# Patient Record
Sex: MALE | Race: WHITE | NOT HISPANIC OR LATINO | Employment: OTHER | URBAN - METROPOLITAN AREA
[De-identification: names, ages, dates, MRNs, and addresses within clinical notes are randomized per-mention and may not be internally consistent; named-entity substitution may affect disease eponyms.]

---

## 2017-03-07 ENCOUNTER — NON-PROVIDER VISIT (OUTPATIENT)
Dept: CARDIOLOGY | Facility: MEDICAL CENTER | Age: 67
End: 2017-03-07
Attending: INTERNAL MEDICINE
Payer: COMMERCIAL

## 2017-03-07 VITALS
BODY MASS INDEX: 24.9 KG/M2 | HEIGHT: 74 IN | HEART RATE: 73 BPM | DIASTOLIC BLOOD PRESSURE: 82 MMHG | SYSTOLIC BLOOD PRESSURE: 140 MMHG | OXYGEN SATURATION: 98 % | WEIGHT: 194 LBS

## 2017-03-07 DIAGNOSIS — I25.10 CORONARY ARTERY DISEASE INVOLVING NATIVE CORONARY ARTERY OF NATIVE HEART WITHOUT ANGINA PECTORIS: ICD-10-CM

## 2017-03-07 DIAGNOSIS — Z95.1 S/P CABG (CORONARY ARTERY BYPASS GRAFT): ICD-10-CM

## 2017-03-07 LAB — TREADMILL STRESS: NORMAL

## 2017-03-07 PROCEDURE — 93015 CV STRESS TEST SUPVJ I&R: CPT | Performed by: INTERNAL MEDICINE

## 2017-03-08 ENCOUNTER — TELEPHONE (OUTPATIENT)
Dept: CARDIOLOGY | Facility: MEDICAL CENTER | Age: 67
End: 2017-03-08

## 2017-03-08 NOTE — TELEPHONE ENCOUNTER
Called patient and advised him of Dr. Nina's treadmill stress test interpretation. Patient verbalized understanding and is thankful for the call.    KENNEDY PAK

## 2017-03-08 NOTE — TELEPHONE ENCOUNTER
----- Message from Emy Nina M.D. sent at 3/8/2017 11:20 AM PST -----  No evidence of new blockeages or concerns  Hope this finds him well    Tx JS

## 2017-03-29 ENCOUNTER — HOSPITAL ENCOUNTER (OUTPATIENT)
Dept: CARDIOLOGY | Facility: MEDICAL CENTER | Age: 67
End: 2017-03-29
Attending: INTERNAL MEDICINE
Payer: COMMERCIAL

## 2017-03-29 DIAGNOSIS — I25.10 CORONARY ARTERY DISEASE INVOLVING NATIVE CORONARY ARTERY OF NATIVE HEART WITHOUT ANGINA PECTORIS: ICD-10-CM

## 2017-03-29 PROCEDURE — 93306 TTE W/DOPPLER COMPLETE: CPT | Mod: 26 | Performed by: INTERNAL MEDICINE

## 2017-03-29 PROCEDURE — 93306 TTE W/DOPPLER COMPLETE: CPT

## 2017-03-30 LAB
LV EJECT FRACT  99904: 60
LV EJECT FRACT MOD 2C 99903: 52.28
LV EJECT FRACT MOD 4C 99902: 62.4
LV EJECT FRACT MOD BP 99901: 57.51

## 2017-03-31 ENCOUNTER — TELEPHONE (OUTPATIENT)
Dept: CARDIOLOGY | Facility: MEDICAL CENTER | Age: 67
End: 2017-03-31

## 2017-03-31 NOTE — TELEPHONE ENCOUNTER
----- Message from Sonya Ramírez R.N. sent at 3/31/2017  4:12 PM PDT -----      ----- Message -----     From: Eym Nina M.D.     Sent: 3/31/2017   2:25 PM       To: Khushi Conway R.N.    Normal & reassuring echo   Great news

## 2017-10-03 ENCOUNTER — TELEPHONE (OUTPATIENT)
Dept: CARDIOLOGY | Facility: MEDICAL CENTER | Age: 67
End: 2017-10-03

## 2017-10-03 DIAGNOSIS — I25.10 CORONARY ARTERY DISEASE INVOLVING NATIVE CORONARY ARTERY OF NATIVE HEART WITHOUT ANGINA PECTORIS: ICD-10-CM

## 2017-10-03 DIAGNOSIS — E78.5 DYSLIPIDEMIA: ICD-10-CM

## 2017-10-03 NOTE — TELEPHONE ENCOUNTER
----- Message from Bonny Norris sent at 10/3/2017  9:31 AM PDT -----  Regarding: lab slip  Contact: [phone]  LA/lissett Russell calling for lab slip in preparation for 11/1 appt with LA, please call if questions, [phone]  Please mail to:  [address]   [facility] [id number]

## 2017-10-24 ENCOUNTER — HOSPITAL ENCOUNTER (OUTPATIENT)
Dept: LAB | Facility: MEDICAL CENTER | Age: 67
End: 2017-10-24
Attending: INTERNAL MEDICINE
Payer: COMMERCIAL

## 2017-10-24 DIAGNOSIS — E78.5 DYSLIPIDEMIA: ICD-10-CM

## 2017-10-24 DIAGNOSIS — I25.10 CORONARY ARTERY DISEASE INVOLVING NATIVE CORONARY ARTERY OF NATIVE HEART WITHOUT ANGINA PECTORIS: ICD-10-CM

## 2017-10-24 PROCEDURE — 80053 COMPREHEN METABOLIC PANEL: CPT

## 2017-10-24 PROCEDURE — 36415 COLL VENOUS BLD VENIPUNCTURE: CPT

## 2017-10-24 PROCEDURE — 80061 LIPID PANEL: CPT

## 2017-10-25 LAB
ALBUMIN SERPL BCP-MCNC: 4 G/DL (ref 3.2–4.9)
ALBUMIN/GLOB SERPL: 1.5 G/DL
ALP SERPL-CCNC: 61 U/L (ref 30–99)
ALT SERPL-CCNC: 19 U/L (ref 2–50)
ANION GAP SERPL CALC-SCNC: 5 MMOL/L (ref 0–11.9)
AST SERPL-CCNC: 25 U/L (ref 12–45)
BILIRUB SERPL-MCNC: 0.9 MG/DL (ref 0.1–1.5)
BUN SERPL-MCNC: 20 MG/DL (ref 8–22)
CALCIUM SERPL-MCNC: 9.4 MG/DL (ref 8.5–10.5)
CHLORIDE SERPL-SCNC: 109 MMOL/L (ref 96–112)
CHOLEST SERPL-MCNC: 140 MG/DL (ref 100–199)
CO2 SERPL-SCNC: 27 MMOL/L (ref 20–33)
CREAT SERPL-MCNC: 1.13 MG/DL (ref 0.5–1.4)
GFR SERPL CREATININE-BSD FRML MDRD: >60 ML/MIN/1.73 M 2
GLOBULIN SER CALC-MCNC: 2.6 G/DL (ref 1.9–3.5)
GLUCOSE SERPL-MCNC: 93 MG/DL (ref 65–99)
HDLC SERPL-MCNC: 55 MG/DL
LDLC SERPL CALC-MCNC: 75 MG/DL
POTASSIUM SERPL-SCNC: 4.7 MMOL/L (ref 3.6–5.5)
PROT SERPL-MCNC: 6.6 G/DL (ref 6–8.2)
SODIUM SERPL-SCNC: 141 MMOL/L (ref 135–145)
TRIGL SERPL-MCNC: 50 MG/DL (ref 0–149)

## 2017-11-01 ENCOUNTER — OFFICE VISIT (OUTPATIENT)
Dept: CARDIOLOGY | Facility: MEDICAL CENTER | Age: 67
End: 2017-11-01
Payer: COMMERCIAL

## 2017-11-01 VITALS
HEIGHT: 74 IN | BODY MASS INDEX: 25.77 KG/M2 | DIASTOLIC BLOOD PRESSURE: 80 MMHG | SYSTOLIC BLOOD PRESSURE: 142 MMHG | WEIGHT: 200.8 LBS | HEART RATE: 52 BPM | OXYGEN SATURATION: 97 %

## 2017-11-01 DIAGNOSIS — Z95.1 S/P CABG (CORONARY ARTERY BYPASS GRAFT): ICD-10-CM

## 2017-11-01 DIAGNOSIS — E78.00 PURE HYPERCHOLESTEROLEMIA: ICD-10-CM

## 2017-11-01 DIAGNOSIS — I25.10 CORONARY ARTERY DISEASE INVOLVING NATIVE CORONARY ARTERY OF NATIVE HEART WITHOUT ANGINA PECTORIS: ICD-10-CM

## 2017-11-01 DIAGNOSIS — I24.9 ACS (ACUTE CORONARY SYNDROME) (HCC): ICD-10-CM

## 2017-11-01 PROCEDURE — 99214 OFFICE O/P EST MOD 30 MIN: CPT | Performed by: INTERNAL MEDICINE

## 2017-11-01 ASSESSMENT — ENCOUNTER SYMPTOMS
NAUSEA: 0
EYES NEGATIVE: 1
CLAUDICATION: 0
WHEEZING: 0
LOSS OF CONSCIOUSNESS: 0
WEIGHT LOSS: 0
BRUISES/BLEEDS EASILY: 0
HEARTBURN: 0
PALPITATIONS: 0
COUGH: 0
DEPRESSION: 0
MYALGIAS: 0
NERVOUS/ANXIOUS: 0
DIZZINESS: 0

## 2017-11-01 NOTE — PROGRESS NOTES
Subjective:   José Arauz is a 66 y.o. male who presents today in follow-up in regards to his multivessel CABG in 2012 in the setting of acute coronary syndrome    Doing very well, fishing in [place] and [place]. Wife is here with him doing well too  One of the best years he's had  No setbacks or chest pains  No trouble with medication        Past Medical History:   Diagnosis Date   • ACS (acute coronary syndrome) (CMS-HCC) 10/24/2012    -10/2012 (Trop 0.3)    • CAD (coronary artery disease) 1/4/2013    -s/p CABG 10/2012 (EF 60%); left internal mammary to left anterior descending, right saphenous vein graft to the first diagonal, saphenous vein graft to the most distal circumflex obtuse marginal branch, and saphenous vein graft to the right posterior descending  (small NSTEMI)     • Other and unspecified hyperlipidemia 1/4/2013     Past Surgical History:   Procedure Laterality Date   • MULTIPLE CORONARY ARTERY BYPASS ENDO VEIN HARVEST  10/25/2012    Performed by Abdi Worthington M.D. at SURGERY [facility] ORS   • OTHER  1975    tonsillectomy   • OTHER  33 yrs ago    vasectomy     Family History   Problem Relation Age of Onset   • Heart Disease Neg Hx    • Heart Failure Neg Hx      History   Smoking Status   • Never Smoker   Smokeless Tobacco   • Never Used     No Known Allergies  Outpatient Encounter Prescriptions as of 11/1/2017   Medication Sig Dispense Refill   • atorvastatin (LIPITOR) 40 MG Tab TAKE 1 TABLET AT BEDTIME 90 Tab 3   • metoprolol (LOPRESSOR) 25 MG Tab TAKE 1 TABLET TWICE A  Tab 3   • Misc Natural Products (GLUCOSAMINE CHOND COMPLEX/MSM PO) Take  by mouth.     • Ascorbic Acid (VITAMIN C PO) Take  by mouth every day.     • aspirin EC 81 MG EC tablet Take 1 Tab by mouth every day. 30 Tab    • atorvastatin (LIPITOR) 40 MG TABS Take 20 mg by mouth every evening.       No facility-administered encounter medications on file as of 11/1/2017.      Review of Systems   Constitutional: Negative for  "malaise/fatigue and weight loss.   Eyes: Negative.    Respiratory: Negative for cough and wheezing.    Cardiovascular: Negative for chest pain, palpitations, claudication and leg swelling.   Gastrointestinal: Negative for heartburn and nausea.   Musculoskeletal: Negative for joint pain and myalgias.   Neurological: Negative for dizziness and loss of consciousness.   Endo/Heme/Allergies: Does not bruise/bleed easily.   Psychiatric/Behavioral: Negative for depression. The patient is not nervous/anxious.    All other systems reviewed and are negative.       Objective:   /80   Pulse (!) 52   Ht 1.88 m (6' 2\")   Wt 91.1 kg (200 lb 12.8 oz)   SpO2 97%   BMI 25.78 kg/m²     Physical Exam   Constitutional: He is oriented to person, place, and time. He appears well-developed.   HENT:   Head: Normocephalic and atraumatic.   Mouth/Throat: Mucous membranes are normal.   Eyes: Conjunctivae and EOM are normal.   Neck: No JVD present. No thyroid mass and no thyromegaly present.   Cardiovascular: Normal rate, regular rhythm, normal heart sounds and intact distal pulses.    No murmur heard.  Well-healed sternal scar   Pulmonary/Chest: Effort normal and breath sounds normal. No respiratory distress.   Abdominal: Bowel sounds are normal.   Musculoskeletal: Normal range of motion. He exhibits no edema or tenderness.   Neurological: He is alert and oriented to person, place, and time. He has normal strength. He displays no tremor. No cranial nerve deficit. He exhibits normal muscle tone.   Skin: Skin is warm and dry. No rash noted.   Psychiatric: He has a normal mood and affect. His behavior is normal.   Vitals reviewed.      Assessment:     1. Coronary artery disease involving native coronary artery of native heart without angina pectoris     2. ACS (acute coronary syndrome) (CMS-HCC)     3. S/P CABG (coronary artery bypass graft)     4. Pure hypercholesterolemia         Medical Decision Making:  Today's Assessment / Status " / Plan:     Healthy and highly active gentleman. We have done Minimal noninvasive chen tingSubsequent to his surgery, and I think it is indicated.     Coronary disease/CABG  Normal exercise tolerance test and echo earlier this year. Reviewed again  Prognosis excellent  Statin and aspirin and low-dose beta blocker  Blood pressure excellent      We talked about the risks and benefits his current medical regimen, he is quite stable on it and no changes are made  We spent more than 5 minutes reviewing lab work in talking about low LDLs in the setting of secondary events, meds are renewed. As usual talked about emergency medical services    RTC with blood work in one year

## 2018-08-28 ENCOUNTER — APPOINTMENT (RX ONLY)
Dept: URBAN - NONMETROPOLITAN AREA CLINIC 1 | Facility: CLINIC | Age: 68
Setting detail: DERMATOLOGY
End: 2018-08-28

## 2018-08-28 DIAGNOSIS — L72.0 EPIDERMAL CYST: ICD-10-CM

## 2018-08-28 DIAGNOSIS — L81.4 OTHER MELANIN HYPERPIGMENTATION: ICD-10-CM

## 2018-08-28 DIAGNOSIS — L82.0 INFLAMED SEBORRHEIC KERATOSIS: ICD-10-CM

## 2018-08-28 DIAGNOSIS — L57.0 ACTINIC KERATOSIS: ICD-10-CM

## 2018-08-28 DIAGNOSIS — D485 NEOPLASM OF UNCERTAIN BEHAVIOR OF SKIN: ICD-10-CM

## 2018-08-28 PROBLEM — D48.5 NEOPLASM OF UNCERTAIN BEHAVIOR OF SKIN: Status: ACTIVE | Noted: 2018-08-28

## 2018-08-28 PROCEDURE — ? OBSERVATION

## 2018-08-28 PROCEDURE — ? COUNSELING

## 2018-08-28 PROCEDURE — 17000 DESTRUCT PREMALG LESION: CPT

## 2018-08-28 PROCEDURE — 99202 OFFICE O/P NEW SF 15 MIN: CPT | Mod: 25

## 2018-08-28 PROCEDURE — ? LIQUID NITROGEN

## 2018-08-28 ASSESSMENT — LOCATION DETAILED DESCRIPTION DERM
LOCATION DETAILED: NASAL DORSUM
LOCATION DETAILED: LEFT PROXIMAL DORSAL FOREARM
LOCATION DETAILED: MID POSTERIOR NECK
LOCATION DETAILED: RIGHT SUPERIOR UPPER BACK
LOCATION DETAILED: RIGHT PROXIMAL DORSAL FOREARM
LOCATION DETAILED: RIGHT INFERIOR MEDIAL UPPER BACK

## 2018-08-28 ASSESSMENT — LOCATION ZONE DERM
LOCATION ZONE: ARM
LOCATION ZONE: NOSE
LOCATION ZONE: NECK
LOCATION ZONE: TRUNK

## 2018-08-28 ASSESSMENT — LOCATION SIMPLE DESCRIPTION DERM
LOCATION SIMPLE: POSTERIOR NECK
LOCATION SIMPLE: NOSE
LOCATION SIMPLE: RIGHT FOREARM
LOCATION SIMPLE: RIGHT UPPER BACK
LOCATION SIMPLE: LEFT FOREARM

## 2018-08-28 NOTE — PROCEDURE: LIQUID NITROGEN
Render Post-Care Instructions In Note?: no
Consent: The patient's consent was obtained including but not limited to risks of crusting, scabbing, blistering, scarring, darker or lighter pigmentary change, recurrence, incomplete removal and infection.
Duration Of Freeze Thaw-Cycle (Seconds): 8
Number Of Freeze-Thaw Cycles: 1 freeze-thaw cycle
Detail Level: Detailed
Post-Care Instructions: I reviewed with the patient in detail post-care instructions. Patient is to wear sunprotection, and avoid picking at any of the treated lesions. Pt may apply Vaseline to crusted or scabbing areas.

## 2018-11-05 ENCOUNTER — TELEPHONE (OUTPATIENT)
Dept: CARDIOLOGY | Facility: MEDICAL CENTER | Age: 68
End: 2018-11-05

## 2018-11-05 DIAGNOSIS — E78.00 PURE HYPERCHOLESTEROLEMIA: ICD-10-CM

## 2018-11-05 DIAGNOSIS — Z95.1 S/P CABG (CORONARY ARTERY BYPASS GRAFT): ICD-10-CM

## 2018-11-05 DIAGNOSIS — I24.9 ACS (ACUTE CORONARY SYNDROME) (HCC): ICD-10-CM

## 2018-11-05 DIAGNOSIS — I25.10 CORONARY ARTERY DISEASE INVOLVING NATIVE CORONARY ARTERY OF NATIVE HEART WITHOUT ANGINA PECTORIS: ICD-10-CM

## 2018-11-05 NOTE — TELEPHONE ENCOUNTER
Bonny Chaudhry, R.IVETTE.   Phone Number: [phone]             LA/yan     Pt calling for lab slip in preparation for his appt with LA. If questions, please call pt at [phone]     Please mail to:   José Arauz   [address]    [place] [address]     CMP and Lipid profile ordered, lab slip mailed to pt

## 2018-11-20 ENCOUNTER — HOSPITAL ENCOUNTER (OUTPATIENT)
Dept: LAB | Facility: MEDICAL CENTER | Age: 68
End: 2018-11-20
Attending: INTERNAL MEDICINE
Payer: COMMERCIAL

## 2018-11-20 DIAGNOSIS — Z95.1 S/P CABG (CORONARY ARTERY BYPASS GRAFT): ICD-10-CM

## 2018-11-20 DIAGNOSIS — I24.9 ACS (ACUTE CORONARY SYNDROME) (HCC): ICD-10-CM

## 2018-11-20 DIAGNOSIS — I25.10 CORONARY ARTERY DISEASE INVOLVING NATIVE CORONARY ARTERY OF NATIVE HEART WITHOUT ANGINA PECTORIS: ICD-10-CM

## 2018-11-20 DIAGNOSIS — E78.00 PURE HYPERCHOLESTEROLEMIA: ICD-10-CM

## 2018-11-20 LAB
ALBUMIN SERPL BCP-MCNC: 4.3 G/DL (ref 3.2–4.9)
ALBUMIN/GLOB SERPL: 1.7 G/DL
ALP SERPL-CCNC: 66 U/L (ref 30–99)
ALT SERPL-CCNC: 23 U/L (ref 2–50)
ANION GAP SERPL CALC-SCNC: 7 MMOL/L (ref 0–11.9)
AST SERPL-CCNC: 29 U/L (ref 12–45)
BILIRUB SERPL-MCNC: 0.9 MG/DL (ref 0.1–1.5)
BUN SERPL-MCNC: 21 MG/DL (ref 8–22)
CALCIUM SERPL-MCNC: 9.7 MG/DL (ref 8.5–10.5)
CHLORIDE SERPL-SCNC: 104 MMOL/L (ref 96–112)
CHOLEST SERPL-MCNC: 138 MG/DL (ref 100–199)
CO2 SERPL-SCNC: 25 MMOL/L (ref 20–33)
CREAT SERPL-MCNC: 1 MG/DL (ref 0.5–1.4)
FASTING STATUS PATIENT QL REPORTED: NORMAL
GLOBULIN SER CALC-MCNC: 2.6 G/DL (ref 1.9–3.5)
GLUCOSE SERPL-MCNC: 94 MG/DL (ref 65–99)
HDLC SERPL-MCNC: 60 MG/DL
LDLC SERPL CALC-MCNC: 68 MG/DL
POTASSIUM SERPL-SCNC: 3.9 MMOL/L (ref 3.6–5.5)
PROT SERPL-MCNC: 6.9 G/DL (ref 6–8.2)
SODIUM SERPL-SCNC: 136 MMOL/L (ref 135–145)
TRIGL SERPL-MCNC: 48 MG/DL (ref 0–149)

## 2018-11-20 PROCEDURE — 36415 COLL VENOUS BLD VENIPUNCTURE: CPT

## 2018-11-20 PROCEDURE — 80053 COMPREHEN METABOLIC PANEL: CPT

## 2018-11-20 PROCEDURE — 80061 LIPID PANEL: CPT

## 2018-11-27 ENCOUNTER — OFFICE VISIT (OUTPATIENT)
Dept: CARDIOLOGY | Facility: MEDICAL CENTER | Age: 68
End: 2018-11-27
Payer: COMMERCIAL

## 2018-11-27 VITALS
DIASTOLIC BLOOD PRESSURE: 70 MMHG | HEIGHT: 74 IN | SYSTOLIC BLOOD PRESSURE: 110 MMHG | OXYGEN SATURATION: 96 % | WEIGHT: 200 LBS | BODY MASS INDEX: 25.67 KG/M2 | HEART RATE: 54 BPM

## 2018-11-27 DIAGNOSIS — Z95.1 S/P CABG (CORONARY ARTERY BYPASS GRAFT): ICD-10-CM

## 2018-11-27 DIAGNOSIS — I24.9 ACS (ACUTE CORONARY SYNDROME) (HCC): ICD-10-CM

## 2018-11-27 DIAGNOSIS — E78.49 OTHER HYPERLIPIDEMIA: ICD-10-CM

## 2018-11-27 DIAGNOSIS — Z12.5 PROSTATE CANCER SCREENING: ICD-10-CM

## 2018-11-27 DIAGNOSIS — I25.10 CORONARY ARTERY DISEASE INVOLVING NATIVE CORONARY ARTERY OF NATIVE HEART WITHOUT ANGINA PECTORIS: ICD-10-CM

## 2018-11-27 PROCEDURE — 99214 OFFICE O/P EST MOD 30 MIN: CPT | Performed by: INTERNAL MEDICINE

## 2018-11-27 RX ORDER — ATORVASTATIN CALCIUM 40 MG/1
40 TABLET, FILM COATED ORAL DAILY
Qty: 90 TAB | Refills: 3 | Status: SHIPPED | OUTPATIENT
Start: 2018-11-27 | End: 2019-02-25

## 2018-11-27 ASSESSMENT — ENCOUNTER SYMPTOMS
BLURRED VISION: 0
CLAUDICATION: 0
MYALGIAS: 0
BRUISES/BLEEDS EASILY: 0
COUGH: 0
HEARTBURN: 0
DOUBLE VISION: 0
NAUSEA: 0
DIZZINESS: 0
PALPITATIONS: 0
DEPRESSION: 0
WHEEZING: 0
EYES NEGATIVE: 1
NERVOUS/ANXIOUS: 0
WEIGHT LOSS: 0
LOSS OF CONSCIOUSNESS: 0

## 2018-11-27 NOTE — PROGRESS NOTES
Subjective:   José Arauz is a 67 almost 68y.o. male who presents today in follow-up in regards to his multivessel CABG in 2012 in the setting of acute coronary syndrome    In with his wife as always, still living remotely.  Wonderful trip to [place], excited for [place] next year.  Going to [place].  Talked about my     Medications as directed still walking miles and miles every day no limitations compliant on his medications        Past Medical History:   Diagnosis Date   • ACS (acute coronary syndrome) (HCC) 10/24/2012    -10/2012 (Trop 0.3)    • CAD (coronary artery disease) 1/4/2013    -s/p CABG 10/2012 (EF 60%); left internal mammary to left anterior descending, right saphenous vein graft to the first diagonal, saphenous vein graft to the most distal circumflex obtuse marginal branch, and saphenous vein graft to the right posterior descending  (small NSTEMI)     • Other and unspecified hyperlipidemia 1/4/2013     Past Surgical History:   Procedure Laterality Date   • MULTIPLE CORONARY ARTERY BYPASS ENDO VEIN HARVEST  10/25/2012    Performed by Abdi Worthington M.D. at SURGERY [facility]   • OTHER  1975    tonsillectomy   • OTHER  33 yrs ago    vasectomy     Family History   Problem Relation Age of Onset   • Heart Disease Neg Hx    • Heart Failure Neg Hx      History   Smoking Status   • Never Smoker   Smokeless Tobacco   • Never Used     No Known Allergies  Outpatient Encounter Prescriptions as of 11/27/2018   Medication Sig Dispense Refill   • atorvastatin (LIPITOR) 40 MG Tab Take 1 Tab by mouth every day for 90 days. 90 Tab 3   • metoprolol (LOPRESSOR) 25 MG Tab TAKE 1 TABLET TWICE A  Tab 3   • Misc Natural Products (GLUCOSAMINE CHOND COMPLEX/MSM PO) Take  by mouth.     • aspirin EC 81 MG EC tablet Take 1 Tab by mouth every day. 30 Tab    • [DISCONTINUED] atorvastatin (LIPITOR) 40 MG Tab TAKE 1 TABLET AT BEDTIME (Patient taking differently: TAKE 1/2 TABLET AT BEDTIME) 90 Tab 3   •  "atorvastatin (LIPITOR) 40 MG TABS Take 20 mg by mouth every evening.     • [DISCONTINUED] Ascorbic Acid (VITAMIN C PO) Take  by mouth every day.       No facility-administered encounter medications on file as of 11/27/2018.      Review of Systems   Constitutional: Negative for malaise/fatigue and weight loss.   HENT: Negative for hearing loss and tinnitus.    Eyes: Negative.  Negative for blurred vision and double vision.   Respiratory: Negative for cough and wheezing.    Cardiovascular: Negative for chest pain, palpitations, claudication and leg swelling.   Gastrointestinal: Negative for heartburn and nausea.   Musculoskeletal: Negative for joint pain and myalgias.   Skin: Negative for rash.   Neurological: Negative for dizziness and loss of consciousness.   Endo/Heme/Allergies: Does not bruise/bleed easily.   Psychiatric/Behavioral: Negative for depression. The patient is not nervous/anxious.    All other systems reviewed and are negative.       Objective:   /70 (BP Location: Left arm, Patient Position: Sitting)   Pulse (!) 54   Ht 1.88 m (6' 2\")   Wt 90.7 kg (200 lb)   SpO2 96%   BMI 25.68 kg/m²     Physical Exam   Constitutional: He is oriented to person, place, and time. He appears well-developed.   Patient seen and examined again today changes noted   HENT:   Head: Normocephalic and atraumatic.   Mouth/Throat: Mucous membranes are normal.   Eyes: Pupils are equal, round, and reactive to light. EOM are normal. No scleral icterus.   Neck: No JVD present. No thyroid mass and no thyromegaly present.   Cardiovascular: Normal rate, regular rhythm, normal heart sounds and intact distal pulses.    No murmur heard.  Well-healed sternal scar   Pulmonary/Chest: Effort normal and breath sounds normal. No respiratory distress. He exhibits no tenderness.   Abdominal: Bowel sounds are normal.   Musculoskeletal: Normal range of motion. He exhibits no edema or tenderness.   Neurological: He is alert and oriented to " person, place, and time. He has normal strength. He displays no tremor. No cranial nerve deficit. He exhibits normal muscle tone.   Skin: Skin is warm and dry. No rash noted.   Psychiatric: He has a normal mood and affect. His behavior is normal.   Vitals reviewed.      Assessment:     1. S/P CABG (coronary artery bypass graft)     2. Coronary artery disease involving native coronary artery of native heart without angina pectoris     3. ACS (acute coronary syndrome) (HCC)     4. Prostate cancer screening     5. Other hyperlipidemia  atorvastatin (LIPITOR) 40 MG Tab       Medical Decision Making:  Today's Assessment / Status / Plan:         Coronary disease/CABG  We looked at images of his exercise tolerance test and echocardiogram from 2017  Prognosis excellent talked about contingency planning including possibility of recurrent heart attack or stroke.  Voices understanding  Laboratory data reviewed   Reviewed again  Prognosis excellent  Statin and aspirin and low-dose beta blocker  Blood pressure excellent    Risk of heart disease  As always, we talked about the risks and benefits his current medical regimen, he is quite stable on it and no changes are made    As always, we spent more than 15 minutes reviewing lab work in talking about low LDLs in the setting of secondary events, meds are renewed. As usual talked about emergency medical services    RTC with blood work including PSA and CBC in one year, has no primary care    Physical Exam   Constitutional: He is oriented to person, place, and time. He appears well-developed.   Patient seen and examined again today changes noted   HENT:   Head: Normocephalic and atraumatic.   Mouth/Throat: Mucous membranes are normal.   Eyes: Pupils are equal, round, and reactive to light. EOM are normal. No scleral icterus.   Neck: No JVD present. No thyroid mass and no thyromegaly present.   Cardiovascular: Normal rate, regular rhythm, normal heart sounds and intact distal pulses.     No murmur heard.  Well-healed sternal scar   Pulmonary/Chest: Effort normal and breath sounds normal. No respiratory distress. He exhibits no tenderness.   Abdominal: Bowel sounds are normal.   Musculoskeletal: Normal range of motion. He exhibits no edema or tenderness.   Neurological: He is alert and oriented to person, place, and time. He has normal strength. He displays no tremor. No cranial nerve deficit. He exhibits normal muscle tone.   Skin: Skin is warm and dry. No rash noted.   Psychiatric: He has a normal mood and affect. His behavior is normal.   Vitals reviewed.

## 2019-11-05 ENCOUNTER — TELEPHONE (OUTPATIENT)
Dept: CARDIOLOGY | Facility: MEDICAL CENTER | Age: 69
End: 2019-11-05

## 2019-11-05 DIAGNOSIS — E78.49 OTHER HYPERLIPIDEMIA: ICD-10-CM

## 2019-11-05 DIAGNOSIS — I25.10 CORONARY ARTERY DISEASE INVOLVING NATIVE CORONARY ARTERY OF NATIVE HEART WITHOUT ANGINA PECTORIS: ICD-10-CM

## 2019-11-05 DIAGNOSIS — Z95.1 S/P CABG (CORONARY ARTERY BYPASS GRAFT): ICD-10-CM

## 2019-11-05 NOTE — TELEPHONE ENCOUNTER
LA      Patient needs labs ordered for his 11/18 appt. HE will go to [facility] lab. Please call him after the labs are ordered. Pt. #[phone].

## 2019-11-05 NOTE — TELEPHONE ENCOUNTER
CMP and Lipid profile ordered.     Attempted to call pt, no answer, detailed vm left that labs is already order and placed.

## 2019-11-13 ENCOUNTER — HOSPITAL ENCOUNTER (OUTPATIENT)
Dept: LAB | Facility: MEDICAL CENTER | Age: 69
End: 2019-11-13
Attending: INTERNAL MEDICINE
Payer: COMMERCIAL

## 2019-11-13 DIAGNOSIS — E78.49 OTHER HYPERLIPIDEMIA: ICD-10-CM

## 2019-11-13 DIAGNOSIS — Z95.1 S/P CABG (CORONARY ARTERY BYPASS GRAFT): ICD-10-CM

## 2019-11-13 DIAGNOSIS — I25.10 CORONARY ARTERY DISEASE INVOLVING NATIVE CORONARY ARTERY OF NATIVE HEART WITHOUT ANGINA PECTORIS: ICD-10-CM

## 2019-11-13 LAB
ALBUMIN SERPL BCP-MCNC: 4.5 G/DL (ref 3.2–4.9)
ALBUMIN/GLOB SERPL: 2 G/DL
ALP SERPL-CCNC: 69 U/L (ref 30–99)
ALT SERPL-CCNC: 23 U/L (ref 2–50)
ANION GAP SERPL CALC-SCNC: 8 MMOL/L (ref 0–11.9)
AST SERPL-CCNC: 29 U/L (ref 12–45)
BILIRUB SERPL-MCNC: 1.2 MG/DL (ref 0.1–1.5)
BUN SERPL-MCNC: 22 MG/DL (ref 8–22)
CALCIUM SERPL-MCNC: 9.3 MG/DL (ref 8.5–10.5)
CHLORIDE SERPL-SCNC: 108 MMOL/L (ref 96–112)
CHOLEST SERPL-MCNC: 149 MG/DL (ref 100–199)
CO2 SERPL-SCNC: 25 MMOL/L (ref 20–33)
CREAT SERPL-MCNC: 1.1 MG/DL (ref 0.5–1.4)
FASTING STATUS PATIENT QL REPORTED: NORMAL
GLOBULIN SER CALC-MCNC: 2.2 G/DL (ref 1.9–3.5)
GLUCOSE SERPL-MCNC: 74 MG/DL (ref 65–99)
HDLC SERPL-MCNC: 62 MG/DL
LDLC SERPL CALC-MCNC: 76 MG/DL
POTASSIUM SERPL-SCNC: 4.4 MMOL/L (ref 3.6–5.5)
PROT SERPL-MCNC: 6.7 G/DL (ref 6–8.2)
SODIUM SERPL-SCNC: 141 MMOL/L (ref 135–145)
TRIGL SERPL-MCNC: 53 MG/DL (ref 0–149)

## 2019-11-13 PROCEDURE — 80061 LIPID PANEL: CPT

## 2019-11-13 PROCEDURE — 80053 COMPREHEN METABOLIC PANEL: CPT

## 2019-11-13 PROCEDURE — 36415 COLL VENOUS BLD VENIPUNCTURE: CPT

## 2019-11-18 ENCOUNTER — TELEPHONE (OUTPATIENT)
Dept: CARDIOLOGY | Facility: MEDICAL CENTER | Age: 69
End: 2019-11-18

## 2019-11-18 NOTE — LETTER
November 18, 2019        José Arauz  [address]  [facility] [address]        Dear José:    Dr Emy Nina reviewed your recent blood test and she said:    Labs perfect.    If you have any questions or concerns, please don't hesitate to call our office, [phone].         Sincerely,    Claribel PAK/Dr Emy Nina

## 2019-11-18 NOTE — TELEPHONE ENCOUNTER
Result Notes for Lipid Profile   Notes recorded by Emy Nina M.D. on 11/18/2019 at 12:10 PM PST  Labs perfect     Attempted to call pt, no answer, left vm to call back    Letter mailed to pt

## 2020-02-06 ENCOUNTER — OFFICE VISIT (OUTPATIENT)
Dept: CARDIOLOGY | Facility: MEDICAL CENTER | Age: 70
End: 2020-02-06
Payer: COMMERCIAL

## 2020-02-06 VITALS
HEART RATE: 60 BPM | SYSTOLIC BLOOD PRESSURE: 144 MMHG | HEIGHT: 74 IN | WEIGHT: 199.52 LBS | DIASTOLIC BLOOD PRESSURE: 78 MMHG | BODY MASS INDEX: 25.61 KG/M2 | OXYGEN SATURATION: 98 %

## 2020-02-06 DIAGNOSIS — I10 ESSENTIAL HYPERTENSION: ICD-10-CM

## 2020-02-06 DIAGNOSIS — I25.10 CORONARY ARTERY DISEASE INVOLVING NATIVE CORONARY ARTERY OF NATIVE HEART WITHOUT ANGINA PECTORIS: ICD-10-CM

## 2020-02-06 DIAGNOSIS — Z95.1 S/P CABG (CORONARY ARTERY BYPASS GRAFT): ICD-10-CM

## 2020-02-06 DIAGNOSIS — E78.00 PURE HYPERCHOLESTEROLEMIA: ICD-10-CM

## 2020-02-06 LAB — EKG IMPRESSION: NORMAL

## 2020-02-06 PROCEDURE — 99214 OFFICE O/P EST MOD 30 MIN: CPT | Performed by: INTERNAL MEDICINE

## 2020-02-06 PROCEDURE — 93000 ELECTROCARDIOGRAM COMPLETE: CPT | Performed by: INTERNAL MEDICINE

## 2020-02-06 RX ORDER — LISINOPRIL 5 MG/1
5 TABLET ORAL DAILY
Qty: 90 TAB | Refills: 3 | Status: SHIPPED | OUTPATIENT
Start: 2020-02-06 | End: 2020-03-10 | Stop reason: SDUPTHER

## 2020-02-06 ASSESSMENT — ENCOUNTER SYMPTOMS
HEARTBURN: 0
FEVER: 0
CONSTIPATION: 0
ALTERED MENTAL STATUS: 0
WEIGHT GAIN: 0
SHORTNESS OF BREATH: 0
VOMITING: 0
PND: 0
WEIGHT LOSS: 0
DIARRHEA: 0
ORTHOPNEA: 0
CLAUDICATION: 0
DECREASED APPETITE: 0
BACK PAIN: 0
DIZZINESS: 0
NEAR-SYNCOPE: 0
IRREGULAR HEARTBEAT: 0
BLURRED VISION: 0
SYNCOPE: 0
COUGH: 0
DEPRESSION: 0
ABDOMINAL PAIN: 0
DYSPNEA ON EXERTION: 0
NAUSEA: 0
FLANK PAIN: 0
PALPITATIONS: 0

## 2020-02-06 NOTE — ASSESSMENT & PLAN NOTE
Goal <130/80. Add lisinopril for BP and survival benefit. Will check at home also. If under home BP low with ACEi, can d/c.

## 2020-02-06 NOTE — PROGRESS NOTES
Cardiology Note    Chief Complaint   Patient presents with   • Coronary Artery Disease       History of Present Illness: José Arauz is a 69 y.o. male PMH CAD/CABG 2012, HLD who presents for follow up.    Denies any cardiac complaints. Stays active. Went snow shoeing with wife recently. Diet much improved. Compliant with medications. Denies adverse effects. He has blood pressure cuff at home but has not been checking.     Review of Systems   Constitution: Negative for decreased appetite, fever, malaise/fatigue, weight gain and weight loss.   HENT: Negative for congestion and nosebleeds.    Eyes: Negative for blurred vision.   Cardiovascular: Negative for chest pain, claudication, dyspnea on exertion, irregular heartbeat, leg swelling, near-syncope, orthopnea, palpitations, paroxysmal nocturnal dyspnea and syncope.   Respiratory: Negative for cough and shortness of breath.    Endocrine: Negative for cold intolerance and heat intolerance.   Skin: Negative for rash.   Musculoskeletal: Negative for back pain.   Gastrointestinal: Negative for abdominal pain, constipation, diarrhea, heartburn, melena, nausea and vomiting.   Genitourinary: Negative for dysuria, flank pain and hematuria.   Neurological: Negative for dizziness.   Psychiatric/Behavioral: Negative for altered mental status and depression.         Past Medical History:   Diagnosis Date   • ACS (acute coronary syndrome) (HCC) 10/24/2012    -10/2012 (Trop 0.3)    • CAD (coronary artery disease) 1/4/2013    -s/p CABG 10/2012 (EF 60%); left internal mammary to left anterior descending, right saphenous vein graft to the first diagonal, saphenous vein graft to the most distal circumflex obtuse marginal branch, and saphenous vein graft to the right posterior descending  (small NSTEMI)     • Other and unspecified hyperlipidemia 1/4/2013         Past Surgical History:   Procedure Laterality Date   • MULTIPLE CORONARY ARTERY BYPASS ENDO VEIN HARVEST  10/25/2012     Performed by Abdi Worthington M.D. at SURGERY [facility]S   • OTHER  1975    tonsillectomy   • OTHER  33 yrs ago    vasectomy         Current Outpatient Medications   Medication Sig Dispense Refill   • lisinopril (PRINIVIL) 5 MG Tab Take 1 Tab by mouth every day. 90 Tab 3   • metoprolol (LOPRESSOR) 25 MG Tab TAKE 1 TABLET TWICE A  Tab 3   • Misc Natural Products (GLUCOSAMINE CHOND COMPLEX/MSM PO) Take  by mouth.     • atorvastatin (LIPITOR) 40 MG TABS Take 20 mg by mouth every evening.     • aspirin EC 81 MG EC tablet Take 1 Tab by mouth every day. 30 Tab      No current facility-administered medications for this visit.          No Known Allergies      Family History   Problem Relation Age of Onset   • Heart Disease Neg Hx    • Heart Failure Neg Hx          Social History     Socioeconomic History   • Marital status:      Spouse name: Not on file   • Number of children: Not on file   • Years of education: Not on file   • Highest education level: Not on file   Occupational History   • Not on file   Social Needs   • Financial resource strain: Not on file   • Food insecurity:     Worry: Not on file     Inability: Not on file   • Transportation needs:     Medical: Not on file     Non-medical: Not on file   Tobacco Use   • Smoking status: Never Smoker   • Smokeless tobacco: Never Used   Substance and Sexual Activity   • Alcohol use: Yes   • Drug use: No   • Sexual activity: Not on file   Lifestyle   • Physical activity:     Days per week: Not on file     Minutes per session: Not on file   • Stress: Not on file   Relationships   • Social connections:     Talks on phone: Not on file     Gets together: Not on file     Attends Zoroastrianism service: Not on file     Active member of club or organization: Not on file     Attends meetings of clubs or organizations: Not on file     Relationship status: Not on file   • Intimate partner violence:     Fear of current or ex partner: Not on file     Emotionally abused:  "Not on file     Physically abused: Not on file     Forced sexual activity: Not on file   Other Topics Concern   • Not on file   Social History Narrative   • Not on file         Physical Exam:  Ambulatory Vitals  /78 (BP Location: Left arm, Patient Position: Sitting, BP Cuff Size: Adult)   Pulse 60   Ht 1.88 m (6' 2\")   Wt 90.5 kg (199 lb 8.3 oz)   SpO2 98%    BP Readings from Last 4 Encounters:   02/06/20 144/78   11/27/18 110/70   11/01/17 142/80   03/07/17 140/82     Weight/BMI:   Vitals:    02/06/20 1241   BP: 144/78   Weight: 90.5 kg (199 lb 8.3 oz)   Height: 1.88 m (6' 2\")    Body mass index is 25.62 kg/m².  Wt Readings from Last 4 Encounters:   02/06/20 90.5 kg (199 lb 8.3 oz)   11/27/18 90.7 kg (200 lb)   11/01/17 91.1 kg (200 lb 12.8 oz)   03/07/17 88 kg (194 lb)       Physical Exam   Constitutional: He is oriented to person, place, and time and well-developed, well-nourished, and in no distress. No distress.   HENT:   Head: Normocephalic and atraumatic.   Eyes: Pupils are equal, round, and reactive to light. Conjunctivae are normal.   Neck: Normal range of motion. Neck supple. No JVD present.   Cardiovascular: Normal rate, regular rhythm, normal heart sounds and intact distal pulses. Exam reveals no gallop and no friction rub.   No murmur heard.  Pulmonary/Chest: Effort normal and breath sounds normal. No respiratory distress. He has no wheezes. He has no rales. He exhibits no tenderness.   Abdominal: Soft. Bowel sounds are normal. He exhibits no distension.   Musculoskeletal:         General: No edema.   Neurological: He is alert and oriented to person, place, and time.   Skin: Skin is warm and dry.   Psychiatric: Affect and judgment normal.       Lab Data Review:  Lab Results   Component Value Date/Time    CHOLSTRLTOT 149 11/13/2019 09:14 AM    LDL 76 11/13/2019 09:14 AM    HDL 62 11/13/2019 09:14 AM    TRIGLYCERIDE 53 11/13/2019 09:14 AM       Lab Results   Component Value Date/Time    SODIUM " 141 11/13/2019 09:14 AM    POTASSIUM 4.4 11/13/2019 09:14 AM    CHLORIDE 108 11/13/2019 09:14 AM    CO2 25 11/13/2019 09:14 AM    GLUCOSE 74 11/13/2019 09:14 AM    BUN 22 11/13/2019 09:14 AM    CREATININE 1.10 11/13/2019 09:14 AM     CrCl cannot be calculated (Patient's most recent lab result is older than the maximum 7 days allowed.).  Lab Results   Component Value Date/Time    ALKPHOSPHAT 69 11/13/2019 09:14 AM    ASTSGOT 29 11/13/2019 09:14 AM    ALTSGPT 23 11/13/2019 09:14 AM    TBILIRUBIN 1.2 11/13/2019 09:14 AM      Lab Results   Component Value Date/Time    WBC 5.8 10/30/2012 02:15 AM     Lab Results   Component Value Date/Time    HBA1C 5.5 09/16/2014 10:09 AM     No components found for: TROP      Cardiac Imaging and Procedures Review:      TTE 3/2017  CONCLUSIONS  Compared to the images of the study done 3/14/14; compared to the   report of the study done - there has been no significant change.   Normal left ventricular systolic function.  Left ventricular ejection fraction is visually estimated to be 60%.  Grade II diastolic dysfunction.  No significant valve abnormalities.   Estimated right ventricular systolic pressure is 20 mmHg.    Medical Decision Making:  Problem List Items Addressed This Visit     CAD (coronary artery disease)     Asymptomatic. Excellent lifestyle choices. Cont aspirin, metop, and statin.         Relevant Medications    lisinopril (PRINIVIL) 5 MG Tab    Other Relevant Orders    EKG (Completed)    Hyperlipidemia    Relevant Medications    lisinopril (PRINIVIL) 5 MG Tab    S/P CABG (coronary artery bypass graft)    Relevant Orders    EKG (Completed)    Essential hypertension     Goal <130/80. Add lisinopril for BP and survival benefit. Will check at home also. If under home BP low with ACEi, can d/c.          Relevant Medications    lisinopril (PRINIVIL) 5 MG Tab    Other Relevant Orders    EKG (Completed)          It was my pleasure to meet with  Regla.

## 2020-02-18 DIAGNOSIS — I10 ESSENTIAL HYPERTENSION: ICD-10-CM

## 2020-03-09 ENCOUNTER — PATIENT MESSAGE (OUTPATIENT)
Dept: CARDIOLOGY | Facility: MEDICAL CENTER | Age: 70
End: 2020-03-09

## 2020-03-10 RX ORDER — LISINOPRIL 10 MG/1
10 TABLET ORAL DAILY
Qty: 90 TAB | Refills: 3 | Status: SHIPPED | OUTPATIENT
Start: 2020-03-10 | End: 2021-02-22

## 2020-05-27 ENCOUNTER — PATIENT MESSAGE (OUTPATIENT)
Dept: CARDIOLOGY | Facility: MEDICAL CENTER | Age: 70
End: 2020-05-27

## 2020-05-27 DIAGNOSIS — I25.10 CORONARY ARTERY DISEASE INVOLVING NATIVE CORONARY ARTERY OF NATIVE HEART WITHOUT ANGINA PECTORIS: ICD-10-CM

## 2020-05-27 DIAGNOSIS — E78.00 PURE HYPERCHOLESTEROLEMIA: ICD-10-CM

## 2020-05-27 RX ORDER — ATORVASTATIN CALCIUM 40 MG/1
20 TABLET, FILM COATED ORAL DAILY
Qty: 30 TAB | Refills: 3 | Status: SHIPPED | OUTPATIENT
Start: 2020-05-27 | End: 2021-01-11

## 2020-12-22 ENCOUNTER — PATIENT MESSAGE (OUTPATIENT)
Dept: CARDIOLOGY | Facility: MEDICAL CENTER | Age: 70
End: 2020-12-22

## 2020-12-23 RX ORDER — SILDENAFIL 50 MG/1
50 TABLET, FILM COATED ORAL PRN
Qty: 10 TAB | Refills: 3 | Status: SHIPPED | OUTPATIENT
Start: 2020-12-23 | End: 2021-06-07 | Stop reason: SDUPTHER

## 2020-12-23 NOTE — PATIENT COMMUNICATION
Gold West M.D.  You 17 hours ago (4:29 PM)     Yea sure. Lets find out what his dosage was and can send 10 pills or so with refills. Thanks Kayla!      Rx sent.

## 2020-12-23 NOTE — PATIENT COMMUNICATION
You  Gold West M.D. 5 hours ago (12:07 PM)     Would you be ok sending a one time Rx for Viagra for pt?      Gold West M.D.  You 1 hour ago (4:29 PM)     Yea sure. Lets find out what his dosage was and can send 10 pills or so with refills. Thanks Kayla!

## 2021-01-10 DIAGNOSIS — I25.10 CORONARY ARTERY DISEASE INVOLVING NATIVE CORONARY ARTERY OF NATIVE HEART WITHOUT ANGINA PECTORIS: ICD-10-CM

## 2021-01-10 DIAGNOSIS — E78.00 PURE HYPERCHOLESTEROLEMIA: ICD-10-CM

## 2021-01-11 RX ORDER — ATORVASTATIN CALCIUM 40 MG/1
TABLET, FILM COATED ORAL
Qty: 45 TAB | Refills: 1 | Status: SHIPPED | OUTPATIENT
Start: 2021-01-11 | End: 2021-07-09

## 2021-01-30 DIAGNOSIS — I10 ESSENTIAL HYPERTENSION: ICD-10-CM

## 2021-04-10 ENCOUNTER — PATIENT MESSAGE (OUTPATIENT)
Dept: CARDIOLOGY | Facility: MEDICAL CENTER | Age: 71
End: 2021-04-10

## 2021-04-10 DIAGNOSIS — I25.10 CORONARY ARTERY DISEASE INVOLVING NATIVE CORONARY ARTERY OF NATIVE HEART WITHOUT ANGINA PECTORIS: ICD-10-CM

## 2021-04-10 DIAGNOSIS — E78.00 PURE HYPERCHOLESTEROLEMIA: ICD-10-CM

## 2021-04-10 DIAGNOSIS — E78.49 OTHER HYPERLIPIDEMIA: ICD-10-CM

## 2021-04-10 DIAGNOSIS — I10 ESSENTIAL HYPERTENSION: ICD-10-CM

## 2021-04-16 ENCOUNTER — HOSPITAL ENCOUNTER (OUTPATIENT)
Dept: LAB | Facility: MEDICAL CENTER | Age: 71
End: 2021-04-16
Attending: INTERNAL MEDICINE
Payer: COMMERCIAL

## 2021-04-16 DIAGNOSIS — I10 ESSENTIAL HYPERTENSION: ICD-10-CM

## 2021-04-16 DIAGNOSIS — E78.00 PURE HYPERCHOLESTEROLEMIA: ICD-10-CM

## 2021-04-16 DIAGNOSIS — I25.10 CORONARY ARTERY DISEASE INVOLVING NATIVE CORONARY ARTERY OF NATIVE HEART WITHOUT ANGINA PECTORIS: ICD-10-CM

## 2021-04-16 DIAGNOSIS — E78.49 OTHER HYPERLIPIDEMIA: ICD-10-CM

## 2021-04-16 LAB
ALBUMIN SERPL BCP-MCNC: 4.3 G/DL (ref 3.2–4.9)
ALBUMIN/GLOB SERPL: 1.7 G/DL
ALP SERPL-CCNC: 70 U/L (ref 30–99)
ALT SERPL-CCNC: 22 U/L (ref 2–50)
ANION GAP SERPL CALC-SCNC: 8 MMOL/L (ref 7–16)
AST SERPL-CCNC: 25 U/L (ref 12–45)
BILIRUB SERPL-MCNC: 0.4 MG/DL (ref 0.1–1.5)
BUN SERPL-MCNC: 20 MG/DL (ref 8–22)
CALCIUM SERPL-MCNC: 9.4 MG/DL (ref 8.5–10.5)
CHLORIDE SERPL-SCNC: 107 MMOL/L (ref 96–112)
CHOLEST SERPL-MCNC: 145 MG/DL (ref 100–199)
CO2 SERPL-SCNC: 26 MMOL/L (ref 20–33)
CREAT SERPL-MCNC: 0.95 MG/DL (ref 0.5–1.4)
FASTING STATUS PATIENT QL REPORTED: NORMAL
GLOBULIN SER CALC-MCNC: 2.6 G/DL (ref 1.9–3.5)
GLUCOSE SERPL-MCNC: 106 MG/DL (ref 65–99)
HDLC SERPL-MCNC: 72 MG/DL
LDLC SERPL CALC-MCNC: 64 MG/DL
POTASSIUM SERPL-SCNC: 4.3 MMOL/L (ref 3.6–5.5)
PROT SERPL-MCNC: 6.9 G/DL (ref 6–8.2)
SODIUM SERPL-SCNC: 141 MMOL/L (ref 135–145)
TRIGL SERPL-MCNC: 44 MG/DL (ref 0–149)

## 2021-04-16 PROCEDURE — 80061 LIPID PANEL: CPT

## 2021-04-16 PROCEDURE — 36415 COLL VENOUS BLD VENIPUNCTURE: CPT

## 2021-04-16 PROCEDURE — 80053 COMPREHEN METABOLIC PANEL: CPT

## 2021-04-23 ENCOUNTER — OFFICE VISIT (OUTPATIENT)
Dept: CARDIOLOGY | Facility: MEDICAL CENTER | Age: 71
End: 2021-04-23
Payer: COMMERCIAL

## 2021-04-23 VITALS
HEIGHT: 74 IN | HEART RATE: 56 BPM | SYSTOLIC BLOOD PRESSURE: 124 MMHG | RESPIRATION RATE: 12 BRPM | OXYGEN SATURATION: 97 % | WEIGHT: 191 LBS | BODY MASS INDEX: 24.51 KG/M2 | DIASTOLIC BLOOD PRESSURE: 62 MMHG

## 2021-04-23 DIAGNOSIS — I25.10 CORONARY ARTERY DISEASE INVOLVING NATIVE CORONARY ARTERY OF NATIVE HEART WITHOUT ANGINA PECTORIS: ICD-10-CM

## 2021-04-23 DIAGNOSIS — E78.00 PURE HYPERCHOLESTEROLEMIA: ICD-10-CM

## 2021-04-23 DIAGNOSIS — Z95.1 S/P CABG (CORONARY ARTERY BYPASS GRAFT): ICD-10-CM

## 2021-04-23 DIAGNOSIS — R73.09 ELEVATED GLUCOSE: ICD-10-CM

## 2021-04-23 DIAGNOSIS — I10 ESSENTIAL HYPERTENSION: ICD-10-CM

## 2021-04-23 PROCEDURE — 99214 OFFICE O/P EST MOD 30 MIN: CPT | Performed by: INTERNAL MEDICINE

## 2021-04-23 ASSESSMENT — ENCOUNTER SYMPTOMS
DIARRHEA: 0
SHORTNESS OF BREATH: 0
COUGH: 0
HEARTBURN: 0
CLAUDICATION: 0
DEPRESSION: 0
BACK PAIN: 0
DECREASED APPETITE: 0
NAUSEA: 0
DIZZINESS: 0
PND: 0
PALPITATIONS: 0
FLANK PAIN: 0
BLURRED VISION: 0
IRREGULAR HEARTBEAT: 0
CONSTIPATION: 0
DYSPNEA ON EXERTION: 0
ALTERED MENTAL STATUS: 0
SYNCOPE: 0
WEIGHT GAIN: 0
ORTHOPNEA: 0
NEAR-SYNCOPE: 0
WEIGHT LOSS: 0
VOMITING: 0
FEVER: 0
ABDOMINAL PAIN: 0

## 2021-04-23 NOTE — PROGRESS NOTES
Cardiology Note    Chief Complaint   Patient presents with   • Coronary Artery Disease   • Hyperlipidemia   • Coronary Artery Bypass Graft (CABG)       History of Present Illness: José Arauz is a 69 y.o. male PMH CAD/CABG 2012, HLD who presents for follow up.    Without cardiac complaints. Very active. Compliant with medications and denies adverse effects. Checks his blood pressure at home and has remained controlled. He lost his PMD and looking for new one. He is concerned about mild glucose elevation    Review of Systems   Constitution: Negative for decreased appetite, fever, malaise/fatigue, weight gain and weight loss.   HENT: Negative for congestion and nosebleeds.    Eyes: Negative for blurred vision.   Cardiovascular: Negative for chest pain, claudication, dyspnea on exertion, irregular heartbeat, leg swelling, near-syncope, orthopnea, palpitations, paroxysmal nocturnal dyspnea and syncope.   Respiratory: Negative for cough and shortness of breath.    Endocrine: Negative for cold intolerance and heat intolerance.   Skin: Negative for rash.   Musculoskeletal: Negative for back pain.   Gastrointestinal: Negative for abdominal pain, constipation, diarrhea, heartburn, melena, nausea and vomiting.   Genitourinary: Negative for dysuria, flank pain and hematuria.   Neurological: Negative for dizziness.   Psychiatric/Behavioral: Negative for altered mental status and depression.         Past Medical History:   Diagnosis Date   • ACS (acute coronary syndrome) (HCC) 10/24/2012    -10/2012 (Trop 0.3)    • CAD (coronary artery disease) 1/4/2013    -s/p CABG 10/2012 (EF 60%); left internal mammary to left anterior descending, right saphenous vein graft to the first diagonal, saphenous vein graft to the most distal circumflex obtuse marginal branch, and saphenous vein graft to the right posterior descending  (small NSTEMI)     • Other and unspecified hyperlipidemia 1/4/2013         Past Surgical History:    Procedure Laterality Date   • MULTIPLE CORONARY ARTERY BYPASS ENDO VEIN HARVEST  10/25/2012    Performed by Abdi Worthington M.D. at SURGERY [facility]   • OTHER  1975    tonsillectomy   • OTHER  33 yrs ago    vasectomy         Current Outpatient Medications   Medication Sig Dispense Refill   • lisinopril (PRINIVIL) 10 MG Tab TAKE 1 TABLET DAILY 90 tablet 0   • metoprolol tartrate (LOPRESSOR) 25 MG Tab TAKE 1 TABLET TWICE A  Tab 0   • atorvastatin (LIPITOR) 40 MG Tab TAKE 1/2 TABLET DAILY 45 Tab 1   • sildenafil citrate (VIAGRA) 50 MG tablet Take 1 Tab by mouth as needed for Erectile Dysfunction. 10 Tab 3   • Misc Natural Products (GLUCOSAMINE CHOND COMPLEX/MSM PO) Take  by mouth.     • aspirin EC 81 MG EC tablet Take 1 Tab by mouth every day. 30 Tab      No current facility-administered medications for this visit.         No Known Allergies      Family History   Problem Relation Age of Onset   • Heart Disease Neg Hx    • Heart Failure Neg Hx          Social History     Socioeconomic History   • Marital status:      Spouse name: Not on file   • Number of children: Not on file   • Years of education: Not on file   • Highest education level: Not on file   Occupational History   • Not on file   Tobacco Use   • Smoking status: Never Smoker   • Smokeless tobacco: Never Used   Substance and Sexual Activity   • Alcohol use: Yes   • Drug use: No   • Sexual activity: Not on file   Other Topics Concern   • Not on file   Social History Narrative   • Not on file     Social Determinants of Health     Financial Resource Strain:    • Difficulty of Paying Living Expenses:    Food Insecurity:    • Worried About Running Out of Food in the Last Year:    • Ran Out of Food in the Last Year:    Transportation Needs:    • Lack of Transportation (Medical):    • Lack of Transportation (Non-Medical):    Physical Activity:    • Days of Exercise per Week:    • Minutes of Exercise per Session:    Stress:    • Feeling of  "Stress :    Social Connections:    • Frequency of Communication with Friends and Family:    • Frequency of Social Gatherings with Friends and Family:    • Attends Hinduism Services:    • Active Member of Clubs or Organizations:    • Attends Club or Organization Meetings:    • Marital Status:    Intimate Partner Violence:    • Fear of Current or Ex-Partner:    • Emotionally Abused:    • Physically Abused:    • Sexually Abused:          Physical Exam:  Ambulatory Vitals  /62 (BP Location: Left arm, Patient Position: Sitting, BP Cuff Size: Adult)   Pulse (!) 56   Resp 12   Ht 1.88 m (6' 2\")   Wt 86.6 kg (191 lb)   SpO2 97%    BP Readings from Last 4 Encounters:   04/23/21 124/62   02/06/20 144/78   11/27/18 110/70   11/01/17 142/80     Weight/BMI:   Vitals:    04/23/21 1117   BP: 124/62   Weight: 86.6 kg (191 lb)   Height: 1.88 m (6' 2\")    Body mass index is 24.52 kg/m².  Wt Readings from Last 4 Encounters:   04/23/21 86.6 kg (191 lb)   02/06/20 90.5 kg (199 lb 8.3 oz)   11/27/18 90.7 kg (200 lb)   11/01/17 91.1 kg (200 lb 12.8 oz)       Physical Exam   Constitutional: He is oriented to person, place, and time and well-developed, well-nourished, and in no distress. No distress.   HENT:   Head: Normocephalic and atraumatic.   Eyes: Pupils are equal, round, and reactive to light. Conjunctivae are normal.   Neck: No JVD present.   Cardiovascular: Normal rate, regular rhythm, normal heart sounds and intact distal pulses. Exam reveals no gallop and no friction rub.   No murmur heard.  Pulmonary/Chest: Effort normal and breath sounds normal. No respiratory distress. He has no wheezes. He has no rales. He exhibits no tenderness.   Abdominal: Soft. Bowel sounds are normal. He exhibits no distension.   Musculoskeletal:         General: No edema.      Cervical back: Normal range of motion and neck supple.   Neurological: He is alert and oriented to person, place, and time.   Skin: Skin is warm and dry. "   Psychiatric: Affect and judgment normal.       Lab Data Review:  Lab Results   Component Value Date/Time    CHOLSTRLTOT 145 04/16/2021 06:56 AM    LDL 64 04/16/2021 06:56 AM    HDL 72 04/16/2021 06:56 AM    TRIGLYCERIDE 44 04/16/2021 06:56 AM       Lab Results   Component Value Date/Time    SODIUM 141 04/16/2021 06:56 AM    POTASSIUM 4.3 04/16/2021 06:56 AM    CHLORIDE 107 04/16/2021 06:56 AM    CO2 26 04/16/2021 06:56 AM    GLUCOSE 106 (H) 04/16/2021 06:56 AM    BUN 20 04/16/2021 06:56 AM    CREATININE 0.95 04/16/2021 06:56 AM     CrCl cannot be calculated (Patient's most recent lab result is older than the maximum 7 days allowed.).  Lab Results   Component Value Date/Time    ALKPHOSPHAT 70 04/16/2021 06:56 AM    ASTSGOT 25 04/16/2021 06:56 AM    ALTSGPT 22 04/16/2021 06:56 AM    TBILIRUBIN 0.4 04/16/2021 06:56 AM      Lab Results   Component Value Date/Time    WBC 5.8 10/30/2012 02:15 AM     Lab Results   Component Value Date/Time    HBA1C 5.5 09/16/2014 10:09 AM     No components found for: TROP      Cardiac Imaging and Procedures Review:      TTE 3/2017  CONCLUSIONS  Compared to the images of the study done 3/14/14; compared to the   report of the study done - there has been no significant change.   Normal left ventricular systolic function.  Left ventricular ejection fraction is visually estimated to be 60%.  Grade II diastolic dysfunction.  No significant valve abnormalities.   Estimated right ventricular systolic pressure is 20 mmHg.    Medical Decision Making:  Problem List Items Addressed This Visit     CAD (coronary artery disease)    Hyperlipidemia    Relevant Orders    AMB REFERRAL TO ESTABLISH WITH [facility] PCP    S/P CABG (coronary artery bypass graft)    Essential hypertension    Elevated glucose    Relevant Orders    Basic Metabolic Panel    HEMOGLOBIN A1C (Glycohemoglobin GHB Total/A1C with MBG Estimate)        CAD / CABG - stable, asymptomatic. Continue aspirin and statin. Continue  metoprolol.    HLD - controlled. Continue high intensity statin. Goal LDL <70 and trig <150.    HTN - controlled. Goal 120/80. Continue regimen.     Glucose elevation - repeat glucose and check a1c.    It was my pleasure to meet with Mr. Arauz.

## 2021-04-29 DIAGNOSIS — I10 ESSENTIAL HYPERTENSION: ICD-10-CM

## 2021-05-21 DIAGNOSIS — I10 ESSENTIAL HYPERTENSION: ICD-10-CM

## 2021-05-21 RX ORDER — LISINOPRIL 10 MG/1
TABLET ORAL
Qty: 90 TABLET | Refills: 3 | Status: SHIPPED | OUTPATIENT
Start: 2021-05-21 | End: 2022-05-16

## 2021-06-05 SDOH — HEALTH STABILITY: PHYSICAL HEALTH: ON AVERAGE, HOW MANY DAYS PER WEEK DO YOU ENGAGE IN MODERATE TO STRENUOUS EXERCISE (LIKE A BRISK WALK)?: 7 DAYS

## 2021-06-05 SDOH — ECONOMIC STABILITY: HOUSING INSECURITY
IN THE LAST 12 MONTHS, WAS THERE A TIME WHEN YOU DID NOT HAVE A STEADY PLACE TO SLEEP OR SLEPT IN A SHELTER (INCLUDING NOW)?: NO

## 2021-06-05 SDOH — ECONOMIC STABILITY: HOUSING INSECURITY: IN THE LAST 12 MONTHS, HOW MANY PLACES HAVE YOU LIVED?: 1

## 2021-06-05 SDOH — ECONOMIC STABILITY: TRANSPORTATION INSECURITY
IN THE PAST 12 MONTHS, HAS LACK OF RELIABLE TRANSPORTATION KEPT YOU FROM MEDICAL APPOINTMENTS, MEETINGS, WORK OR FROM GETTING THINGS NEEDED FOR DAILY LIVING?: NO

## 2021-06-05 SDOH — ECONOMIC STABILITY: INCOME INSECURITY: HOW HARD IS IT FOR YOU TO PAY FOR THE VERY BASICS LIKE FOOD, HOUSING, MEDICAL CARE, AND HEATING?: NOT HARD AT ALL

## 2021-06-05 SDOH — ECONOMIC STABILITY: INCOME INSECURITY: IN THE LAST 12 MONTHS, WAS THERE A TIME WHEN YOU WERE NOT ABLE TO PAY THE MORTGAGE OR RENT ON TIME?: NO

## 2021-06-05 SDOH — ECONOMIC STABILITY: TRANSPORTATION INSECURITY
IN THE PAST 12 MONTHS, HAS THE LACK OF TRANSPORTATION KEPT YOU FROM MEDICAL APPOINTMENTS OR FROM GETTING MEDICATIONS?: NO

## 2021-06-05 SDOH — HEALTH STABILITY: PHYSICAL HEALTH: ON AVERAGE, HOW MANY MINUTES DO YOU ENGAGE IN EXERCISE AT THIS LEVEL?: 60 MIN

## 2021-06-05 SDOH — ECONOMIC STABILITY: TRANSPORTATION INSECURITY
IN THE PAST 12 MONTHS, HAS LACK OF TRANSPORTATION KEPT YOU FROM MEETINGS, WORK, OR FROM GETTING THINGS NEEDED FOR DAILY LIVING?: NO

## 2021-06-05 SDOH — HEALTH STABILITY: MENTAL HEALTH
STRESS IS WHEN SOMEONE FEELS TENSE, NERVOUS, ANXIOUS, OR CAN'T SLEEP AT NIGHT BECAUSE THEIR MIND IS TROUBLED. HOW STRESSED ARE YOU?: ONLY A LITTLE

## 2021-06-05 SDOH — ECONOMIC STABILITY: FOOD INSECURITY: WITHIN THE PAST 12 MONTHS, YOU WORRIED THAT YOUR FOOD WOULD RUN OUT BEFORE YOU GOT MONEY TO BUY MORE.: NEVER TRUE

## 2021-06-05 SDOH — ECONOMIC STABILITY: FOOD INSECURITY: WITHIN THE PAST 12 MONTHS, THE FOOD YOU BOUGHT JUST DIDN'T LAST AND YOU DIDN'T HAVE MONEY TO GET MORE.: NEVER TRUE

## 2021-06-05 ASSESSMENT — SOCIAL DETERMINANTS OF HEALTH (SDOH)
HOW OFTEN DO YOU GET TOGETHER WITH FRIENDS OR RELATIVES?: ONCE A WEEK
HOW OFTEN DO YOU ATTENT MEETINGS OF THE CLUB OR ORGANIZATION YOU BELONG TO?: MORE THAN 4 TIMES PER YEAR
HOW OFTEN DO YOU ATTENT MEETINGS OF THE CLUB OR ORGANIZATION YOU BELONG TO?: MORE THAN 4 TIMES PER YEAR
HOW OFTEN DO YOU HAVE SIX OR MORE DRINKS ON ONE OCCASION: NEVER
WITHIN THE PAST 12 MONTHS, YOU WORRIED THAT YOUR FOOD WOULD RUN OUT BEFORE YOU GOT THE MONEY TO BUY MORE: NEVER TRUE
HOW MANY DRINKS CONTAINING ALCOHOL DO YOU HAVE ON A TYPICAL DAY WHEN YOU ARE DRINKING: 1 OR 2
DO YOU BELONG TO ANY CLUBS OR ORGANIZATIONS SUCH AS CHURCH GROUPS UNIONS, FRATERNAL OR ATHLETIC GROUPS, OR SCHOOL GROUPS?: YES
HOW HARD IS IT FOR YOU TO PAY FOR THE VERY BASICS LIKE FOOD, HOUSING, MEDICAL CARE, AND HEATING?: NOT HARD AT ALL
IN A TYPICAL WEEK, HOW MANY TIMES DO YOU TALK ON THE PHONE WITH FAMILY, FRIENDS, OR NEIGHBORS?: ONCE A WEEK
IN A TYPICAL WEEK, HOW MANY TIMES DO YOU TALK ON THE PHONE WITH FAMILY, FRIENDS, OR NEIGHBORS?: ONCE A WEEK
DO YOU BELONG TO ANY CLUBS OR ORGANIZATIONS SUCH AS CHURCH GROUPS UNIONS, FRATERNAL OR ATHLETIC GROUPS, OR SCHOOL GROUPS?: YES
HOW OFTEN DO YOU HAVE A DRINK CONTAINING ALCOHOL: 2-4 TIMES A MONTH
HOW OFTEN DO YOU ATTEND CHURCH OR RELIGIOUS SERVICES?: MORE THAN 4 TIMES PER YEAR
HOW OFTEN DO YOU ATTEND CHURCH OR RELIGIOUS SERVICES?: MORE THAN 4 TIMES PER YEAR
HOW OFTEN DO YOU GET TOGETHER WITH FRIENDS OR RELATIVES?: ONCE A WEEK

## 2021-06-05 ASSESSMENT — LIFESTYLE VARIABLES
HOW OFTEN DO YOU HAVE A DRINK CONTAINING ALCOHOL: 2-4 TIMES A MONTH
HOW OFTEN DO YOU HAVE SIX OR MORE DRINKS ON ONE OCCASION: NEVER
HOW MANY STANDARD DRINKS CONTAINING ALCOHOL DO YOU HAVE ON A TYPICAL DAY: 1 OR 2

## 2021-06-07 ENCOUNTER — OFFICE VISIT (OUTPATIENT)
Dept: MEDICAL GROUP | Facility: PHYSICIAN GROUP | Age: 71
End: 2021-06-07
Attending: INTERNAL MEDICINE
Payer: COMMERCIAL

## 2021-06-07 ENCOUNTER — HOSPITAL ENCOUNTER (OUTPATIENT)
Dept: LAB | Facility: MEDICAL CENTER | Age: 71
End: 2021-06-07
Attending: FAMILY MEDICINE
Payer: COMMERCIAL

## 2021-06-07 VITALS
HEIGHT: 73 IN | DIASTOLIC BLOOD PRESSURE: 68 MMHG | OXYGEN SATURATION: 97 % | RESPIRATION RATE: 14 BRPM | SYSTOLIC BLOOD PRESSURE: 128 MMHG | TEMPERATURE: 98.5 F | WEIGHT: 188 LBS | HEART RATE: 56 BPM | BODY MASS INDEX: 24.92 KG/M2

## 2021-06-07 DIAGNOSIS — Z23 NEED FOR VACCINATION: ICD-10-CM

## 2021-06-07 DIAGNOSIS — E78.00 PURE HYPERCHOLESTEROLEMIA: ICD-10-CM

## 2021-06-07 DIAGNOSIS — Z12.5 PROSTATE CANCER SCREENING: ICD-10-CM

## 2021-06-07 DIAGNOSIS — Z12.11 COLON CANCER SCREENING: ICD-10-CM

## 2021-06-07 DIAGNOSIS — I10 ESSENTIAL HYPERTENSION: ICD-10-CM

## 2021-06-07 DIAGNOSIS — I25.10 CORONARY ARTERY DISEASE INVOLVING NATIVE CORONARY ARTERY OF NATIVE HEART WITHOUT ANGINA PECTORIS: ICD-10-CM

## 2021-06-07 DIAGNOSIS — R73.09 ELEVATED GLUCOSE: ICD-10-CM

## 2021-06-07 PROBLEM — Z00.00 WELLNESS EXAMINATION: Status: ACTIVE | Noted: 2018-11-27

## 2021-06-07 LAB — PSA SERPL-MCNC: 1.71 NG/ML (ref 0–4)

## 2021-06-07 PROCEDURE — 90715 TDAP VACCINE 7 YRS/> IM: CPT | Performed by: INTERNAL MEDICINE

## 2021-06-07 PROCEDURE — 84153 ASSAY OF PSA TOTAL: CPT

## 2021-06-07 PROCEDURE — 36415 COLL VENOUS BLD VENIPUNCTURE: CPT

## 2021-06-07 PROCEDURE — 99397 PER PM REEVAL EST PAT 65+ YR: CPT | Mod: 25 | Performed by: FAMILY MEDICINE

## 2021-06-07 PROCEDURE — 90471 IMMUNIZATION ADMIN: CPT | Performed by: INTERNAL MEDICINE

## 2021-06-07 RX ORDER — SILDENAFIL 50 MG/1
50 TABLET, FILM COATED ORAL PRN
Qty: 10 TABLET | Refills: 3 | Status: SHIPPED | OUTPATIENT
Start: 2021-06-07 | End: 2021-06-17 | Stop reason: SDUPTHER

## 2021-06-07 ASSESSMENT — PATIENT HEALTH QUESTIONNAIRE - PHQ9: CLINICAL INTERPRETATION OF PHQ2 SCORE: 0

## 2021-06-07 NOTE — ASSESSMENT & PLAN NOTE
This is a chronic problem.  Patient had a CABG done in 2012.  He has had no cardiac symptoms since then.  He follows up with a cardiologist regularly.

## 2021-06-07 NOTE — ASSESSMENT & PLAN NOTE
Patient is here today to establish care.  He states he feels very well.  Is been getting most of his primary care through his cardiologist.  Labs have been normal mostly within normal limits although he has not had a PSA done since 2014.  He like to have that done today.  States he gets aches and pains once in a while but nothing unusual.

## 2021-06-07 NOTE — PROGRESS NOTES
Subjective:     CC: Patient is here today to establish care.    HPI:   José presents today with following medical concerns:    Wellness examination  Patient is here today to establish care.  He states he feels very well.  Is been getting most of his primary care through his cardiologist.  Labs have been normal mostly within normal limits although he has not had a PSA done since 2014.  He like to have that done today.  States he gets aches and pains once in a while but nothing unusual.    Hyperlipidemia  This is a chronic problem.  Patient is on a statin and tolerating it well.    Essential hypertension  This is a chronic problem.  Blood pressures well controlled on current medications.    Elevated glucose  This is a new problem.  On his last lab work his glucose was slightly elevated at 106.  Cardiologist has ordered a hemoglobin A1c when he has his next testing done.    CAD (coronary artery disease)  This is a chronic problem.  Patient had a CABG done in 2012.  He has had no cardiac symptoms since then.  He follows up with a cardiologist regularly.      Past Medical History:   Diagnosis Date   • ACS (acute coronary syndrome) ([facility]) 10/24/2012    -10/2012 (Trop 0.3)    • CAD (coronary artery disease) 1/4/2013    -s/p CABG 10/2012 (EF 60%); left internal mammary to left anterior descending, right saphenous vein graft to the first diagonal, saphenous vein graft to the most distal circumflex obtuse marginal branch, and saphenous vein graft to the right posterior descending  (small NSTEMI)     • Other and unspecified hyperlipidemia 1/4/2013       Social History     Tobacco Use   • Smoking status: Never Smoker   • Smokeless tobacco: Never Used   Vaping Use   • Vaping Use: Never used   Substance Use Topics   • Alcohol use: Yes     Comment: rare   • Drug use: No       Current Outpatient Medications Ordered in Epic   Medication Sig Dispense Refill   • sildenafil citrate (VIAGRA) 50 MG tablet Take 1 tablet by mouth as needed  "for Erectile Dysfunction. 10 tablet 3   • lisinopril (PRINIVIL) 10 MG Tab TAKE 1 TABLET DAILY 90 tablet 3   • metoprolol tartrate (LOPRESSOR) 25 MG Tab TAKE 1 TABLET TWICE A  tablet 3   • atorvastatin (LIPITOR) 40 MG Tab TAKE 1/2 TABLET DAILY 45 Tab 1   • Misc Natural Products (GLUCOSAMINE CHOND COMPLEX/MSM PO) Take  by mouth.     • aspirin EC 81 MG EC tablet Take 1 Tab by mouth every day. 30 Tab      No current Epic-ordered facility-administered medications on file.       Allergies:  Patient has no known allergies.    Health Maintenance: Completed    ROS:  Gen: no fevers/chills, no changes in weight  Eyes: no changes in vision  ENT: no sore throat, no hearing loss, no bloody nose  Pulm: no sob, no cough  CV: no chest pain, no palpitations  GI: no nausea/vomiting, no diarrhea  : no dysuria  MSk: no myalgias  Skin: no rash  Neuro: no headaches, no numbness/tingling  Heme/Lymph: no easy bruising      Objective:       Exam:  /68 (BP Location: Left arm, Patient Position: Sitting, BP Cuff Size: Adult)   Pulse (!) 56   Temp 36.9 °C (98.5 °F) (Temporal)   Resp 14   Ht 1.848 m (6' 0.75\")   Wt 85.3 kg (188 lb)   SpO2 97%   BMI 24.97 kg/m²  Body mass index is 24.97 kg/m².    Gen: Alert and oriented, No apparent distress.  Eyes:   Pupils equal react to light accommodation.  Extraocular motion is intact.  No scleral icterus seen.  ENT:     Ear canals and TMs are normal.    Neck: Neck is supple without lymphadenopathy.  Thyroid exam is normal.  Lungs: Normal effort, CTA bilaterally, no wheezes, rhonchi, or rales  CV: Regular rate and rhythm. No murmurs, rubs, or gallops.  Abdomen: Soft, nontender, no organomegaly or masses and normal bowel sounds.  Ext: No clubbing, cyanosis, edema.  Neuro: Cranial nerves II through VIII are grossly intact.  No lateralizing signs are seen.  Gait is normal.  Psych: Patient is alert and oriented x3.  No unusual thought process expressed.  No evidence of anxiety or " depression.        Labs: Previous lab results reviewed.    Assessment & Plan:     70 y.o. male with the following -     1. Need for vaccination  This is a screening issue.  Tdap given today.  - TDAP VACCINE =>6YO IM    2. Prostate cancer screening  This is a chronic screening issue.  PSA ordered.  - PROSTATE SPECIFIC AG SCREENING; Future    3. Colon cancer screening  This is a screening issue.  Cologuard ordered.  - COLOGUARD (FIT DNA)    4. Pure hypercholesterolemia  This is a chronic condition.  Patient's lipids are well controlled on current medications.    5. Essential hypertension  This is a chronic problem.  Patient blood pressures well controlled on current medications.    6. Elevated glucose  This is a new finding.  Continue to follow.    7. Coronary artery disease involving native coronary artery of native heart without angina pectoris  Is a chronic condition.  Continue current medications and follow-up with cardiology.      Return in about 1 year (around 6/7/2022) for annual exam.    Please note that this dictation was created using voice recognition software. I have made every reasonable attempt to correct obvious errors, but I expect that there are errors of grammar and possibly content that I did not discover before finalizing the note.

## 2021-06-07 NOTE — ASSESSMENT & PLAN NOTE
This is a new problem.  On his last lab work his glucose was slightly elevated at 106.  Cardiologist has ordered a hemoglobin A1c when he has his next testing done.

## 2021-06-17 RX ORDER — SILDENAFIL 50 MG/1
50 TABLET, FILM COATED ORAL PRN
Qty: 10 TABLET | Refills: 3 | Status: SHIPPED | OUTPATIENT
Start: 2021-06-17 | End: 2022-11-07 | Stop reason: SDUPTHER

## 2021-07-09 DIAGNOSIS — E78.00 PURE HYPERCHOLESTEROLEMIA: ICD-10-CM

## 2021-07-09 DIAGNOSIS — I25.10 CORONARY ARTERY DISEASE INVOLVING NATIVE CORONARY ARTERY OF NATIVE HEART WITHOUT ANGINA PECTORIS: ICD-10-CM

## 2021-07-09 RX ORDER — ATORVASTATIN CALCIUM 40 MG/1
TABLET, FILM COATED ORAL
Qty: 45 TABLET | Refills: 3 | Status: SHIPPED | OUTPATIENT
Start: 2021-07-09 | End: 2022-06-27

## 2022-04-22 ENCOUNTER — HOSPITAL ENCOUNTER (OUTPATIENT)
Dept: LAB | Facility: MEDICAL CENTER | Age: 72
End: 2022-04-22
Attending: INTERNAL MEDICINE
Payer: COMMERCIAL

## 2022-04-22 DIAGNOSIS — R73.09 ELEVATED GLUCOSE: ICD-10-CM

## 2022-04-22 LAB
ANION GAP SERPL CALC-SCNC: 11 MMOL/L (ref 7–16)
BUN SERPL-MCNC: 17 MG/DL (ref 8–22)
CALCIUM SERPL-MCNC: 9.7 MG/DL (ref 8.5–10.5)
CHLORIDE SERPL-SCNC: 106 MMOL/L (ref 96–112)
CO2 SERPL-SCNC: 25 MMOL/L (ref 20–33)
CREAT SERPL-MCNC: 1.03 MG/DL (ref 0.5–1.4)
EST. AVERAGE GLUCOSE BLD GHB EST-MCNC: 111 MG/DL
GFR SERPLBLD CREATININE-BSD FMLA CKD-EPI: 77 ML/MIN/1.73 M 2
GLUCOSE SERPL-MCNC: 98 MG/DL (ref 65–99)
HBA1C MFR BLD: 5.5 % (ref 4–5.6)
POTASSIUM SERPL-SCNC: 4.4 MMOL/L (ref 3.6–5.5)
SODIUM SERPL-SCNC: 142 MMOL/L (ref 135–145)

## 2022-04-22 PROCEDURE — 83036 HEMOGLOBIN GLYCOSYLATED A1C: CPT

## 2022-04-22 PROCEDURE — 80048 BASIC METABOLIC PNL TOTAL CA: CPT

## 2022-04-22 PROCEDURE — 36415 COLL VENOUS BLD VENIPUNCTURE: CPT

## 2022-04-23 ENCOUNTER — PATIENT MESSAGE (OUTPATIENT)
Dept: CARDIOLOGY | Facility: MEDICAL CENTER | Age: 72
End: 2022-04-23
Payer: COMMERCIAL

## 2022-04-23 DIAGNOSIS — I25.10 CORONARY ARTERY DISEASE INVOLVING NATIVE CORONARY ARTERY OF NATIVE HEART WITHOUT ANGINA PECTORIS: ICD-10-CM

## 2022-04-23 DIAGNOSIS — E78.49 OTHER HYPERLIPIDEMIA: ICD-10-CM

## 2022-04-28 ENCOUNTER — HOSPITAL ENCOUNTER (OUTPATIENT)
Dept: LAB | Facility: MEDICAL CENTER | Age: 72
End: 2022-04-28
Attending: INTERNAL MEDICINE
Payer: COMMERCIAL

## 2022-04-28 DIAGNOSIS — E78.49 OTHER HYPERLIPIDEMIA: ICD-10-CM

## 2022-04-28 DIAGNOSIS — I25.10 CORONARY ARTERY DISEASE INVOLVING NATIVE CORONARY ARTERY OF NATIVE HEART WITHOUT ANGINA PECTORIS: ICD-10-CM

## 2022-04-28 LAB
CHOLEST SERPL-MCNC: 151 MG/DL (ref 100–199)
FASTING STATUS PATIENT QL REPORTED: NORMAL
HDLC SERPL-MCNC: 70 MG/DL
LDLC SERPL CALC-MCNC: 72 MG/DL
TRIGL SERPL-MCNC: 47 MG/DL (ref 0–149)

## 2022-04-28 PROCEDURE — 80061 LIPID PANEL: CPT

## 2022-04-28 PROCEDURE — 36415 COLL VENOUS BLD VENIPUNCTURE: CPT

## 2022-07-15 ENCOUNTER — OFFICE VISIT (OUTPATIENT)
Dept: CARDIOLOGY | Facility: MEDICAL CENTER | Age: 72
End: 2022-07-15
Payer: COMMERCIAL

## 2022-07-15 VITALS
SYSTOLIC BLOOD PRESSURE: 118 MMHG | WEIGHT: 193 LBS | HEART RATE: 52 BPM | DIASTOLIC BLOOD PRESSURE: 64 MMHG | HEIGHT: 74 IN | BODY MASS INDEX: 24.77 KG/M2 | OXYGEN SATURATION: 99 % | RESPIRATION RATE: 14 BRPM

## 2022-07-15 DIAGNOSIS — Z95.1 S/P CABG (CORONARY ARTERY BYPASS GRAFT): ICD-10-CM

## 2022-07-15 DIAGNOSIS — I10 ESSENTIAL HYPERTENSION: ICD-10-CM

## 2022-07-15 DIAGNOSIS — I25.10 CORONARY ARTERY DISEASE INVOLVING NATIVE CORONARY ARTERY OF NATIVE HEART WITHOUT ANGINA PECTORIS: ICD-10-CM

## 2022-07-15 DIAGNOSIS — E78.00 PURE HYPERCHOLESTEROLEMIA: ICD-10-CM

## 2022-07-15 PROCEDURE — 99214 OFFICE O/P EST MOD 30 MIN: CPT | Performed by: INTERNAL MEDICINE

## 2022-07-15 ASSESSMENT — ENCOUNTER SYMPTOMS
DYSPNEA ON EXERTION: 0
WEIGHT LOSS: 0
CLAUDICATION: 0
DEPRESSION: 0
DIARRHEA: 0
BLURRED VISION: 0
ABDOMINAL PAIN: 0
SHORTNESS OF BREATH: 0
WEIGHT GAIN: 0
PALPITATIONS: 0
DECREASED APPETITE: 0
FLANK PAIN: 0
PND: 0
COUGH: 0
NEAR-SYNCOPE: 0
BACK PAIN: 0
CONSTIPATION: 0
ORTHOPNEA: 0
FEVER: 0
DIZZINESS: 0
HEARTBURN: 0
VOMITING: 0
NAUSEA: 0
SYNCOPE: 0
ALTERED MENTAL STATUS: 0
IRREGULAR HEARTBEAT: 0

## 2022-07-15 NOTE — PROGRESS NOTES
Cardiology Note    Chief Complaint   Patient presents with   • Hyperlipidemia     F/V Dx: Pure hypercholesterolemia   • Coronary Artery Bypass Graft (CABG)     F/V Dx: S/P CABG (coronary artery bypass graft)       History of Present Illness: José Arauz is a 71 y.o. male PMH CAD/CABG 2012, HLD, HTN who presents for follow up.    Doing well since last visit. Has been active. Traveled to [place] for fishing. compliant with medications and denies adverse effects. Estimates could improve diet to get cholesterol under better control and would like to avoid medicine change right now.    Review of Systems   Constitutional: Negative for decreased appetite, fever, malaise/fatigue, weight gain and weight loss.   HENT: Negative for congestion and nosebleeds.    Eyes: Negative for blurred vision.   Cardiovascular: Negative for chest pain, claudication, dyspnea on exertion, irregular heartbeat, leg swelling, near-syncope, orthopnea, palpitations, paroxysmal nocturnal dyspnea and syncope.   Respiratory: Negative for cough and shortness of breath.    Endocrine: Negative for cold intolerance and heat intolerance.   Skin: Negative for rash.   Musculoskeletal: Negative for back pain.   Gastrointestinal: Negative for abdominal pain, constipation, diarrhea, heartburn, melena, nausea and vomiting.   Genitourinary: Negative for dysuria, flank pain and hematuria.   Neurological: Negative for dizziness.   Psychiatric/Behavioral: Negative for altered mental status and depression.         Past Medical History:   Diagnosis Date   • ACS (acute coronary syndrome) (HCC) 10/24/2012    -10/2012 (Trop 0.3)    • CAD (coronary artery disease) 1/4/2013    -s/p CABG 10/2012 (EF 60%); left internal mammary to left anterior descending, right saphenous vein graft to the first diagonal, saphenous vein graft to the most distal circumflex obtuse marginal branch, and saphenous vein graft to the right posterior descending  (small NSTEMI)     • Other and  unspecified hyperlipidemia 1/4/2013         Past Surgical History:   Procedure Laterality Date   • MULTIPLE CORONARY ARTERY BYPASS ENDO VEIN HARVEST  10/25/2012    Performed by Abdi Worthington M.D. at SURGERY [facility]   • OTHER  1975    tonsillectomy   • OTHER  33 yrs ago    vasectomy         Current Outpatient Medications   Medication Sig Dispense Refill   • atorvastatin (LIPITOR) 40 MG Tab Take 0.5 Tablets by mouth every evening. Please complete follow up as scheduled. Thank you 45 Tablet 0   • metoprolol tartrate (LOPRESSOR) 25 MG Tab TAKE 1 TABLET TWICE A  Tablet 0   • lisinopril (PRINIVIL) 10 MG Tab TAKE 1 TABLET DAILY 90 Tablet 0   • sildenafil citrate (VIAGRA) 50 MG tablet Take 1 tablet by mouth as needed for Erectile Dysfunction. 10 tablet 3   • Misc Natural Products (GLUCOSAMINE CHOND COMPLEX/MSM PO) Take  by mouth.     • aspirin EC 81 MG EC tablet Take 1 Tab by mouth every day. 30 Tab      No current facility-administered medications for this visit.         No Known Allergies      Family History   Problem Relation Age of Onset   • Heart Disease Neg Hx    • Heart Failure Neg Hx          Social History     Socioeconomic History   • Marital status:      Spouse name: Not on file   • Number of children: Not on file   • Years of education: Not on file   • Highest education level: Associate degree: academic program   Occupational History   • Not on file   Tobacco Use   • Smoking status: Never Smoker   • Smokeless tobacco: Never Used   Vaping Use   • Vaping Use: Never used   Substance and Sexual Activity   • Alcohol use: Yes     Comment: rare   • Drug use: No   • Sexual activity: Not on file   Other Topics Concern   • Not on file   Social History Narrative   • Not on file     Social Determinants of Health     Financial Resource Strain: Not on file   Food Insecurity: Not on file   Transportation Needs: Not on file   Physical Activity: Not on file   Stress: Not on file   Social Connections: Not  "on file   Intimate Partner Violence: Not on file   Housing Stability: Not on file         Physical Exam:  Ambulatory Vitals  /64 (BP Location: Left arm, Patient Position: Sitting, BP Cuff Size: Adult)   Pulse (!) 52   Resp 14   Ht 1.88 m (6' 2\")   Wt 87.5 kg (193 lb)   SpO2 99%    BP Readings from Last 4 Encounters:   07/15/22 118/64   06/07/21 128/68   04/23/21 124/62   02/06/20 144/78     Weight/BMI:   Vitals:    07/15/22 0851   BP: 118/64   Weight: 87.5 kg (193 lb)   Height: 1.88 m (6' 2\")    Body mass index is 24.78 kg/m².  Wt Readings from Last 4 Encounters:   07/15/22 87.5 kg (193 lb)   06/07/21 85.3 kg (188 lb)   04/23/21 86.6 kg (191 lb)   02/06/20 90.5 kg (199 lb 8.3 oz)       Physical Exam  Constitutional:       General: He is not in acute distress.  HENT:      Head: Normocephalic and atraumatic.   Eyes:      Conjunctiva/sclera: Conjunctivae normal.      Pupils: Pupils are equal, round, and reactive to light.   Neck:      Vascular: No JVD.   Cardiovascular:      Rate and Rhythm: Normal rate and regular rhythm.      Heart sounds: Normal heart sounds. No murmur heard.    No friction rub. No gallop.   Pulmonary:      Effort: Pulmonary effort is normal. No respiratory distress.      Breath sounds: Normal breath sounds. No wheezing or rales.   Chest:      Chest wall: No tenderness.   Abdominal:      General: Bowel sounds are normal. There is no distension.      Palpations: Abdomen is soft.   Musculoskeletal:      Cervical back: Normal range of motion and neck supple.   Skin:     General: Skin is warm and dry.   Neurological:      Mental Status: He is alert and oriented to person, place, and time.   Psychiatric:         Mood and Affect: Affect normal.         Judgment: Judgment normal.         Lab Data Review:  Lab Results   Component Value Date/Time    CHOLSTRLTOT 151 04/28/2022 07:52 AM    LDL 72 04/28/2022 07:52 AM    HDL 70 04/28/2022 07:52 AM    TRIGLYCERIDE 47 04/28/2022 07:52 AM       Lab " Results   Component Value Date/Time    SODIUM 142 04/22/2022 09:15 AM    POTASSIUM 4.4 04/22/2022 09:15 AM    CHLORIDE 106 04/22/2022 09:15 AM    CO2 25 04/22/2022 09:15 AM    GLUCOSE 98 04/22/2022 09:15 AM    BUN 17 04/22/2022 09:15 AM    CREATININE 1.03 04/22/2022 09:15 AM     CrCl cannot be calculated (Patient's most recent lab result is older than the maximum 7 days allowed.).  Lab Results   Component Value Date/Time    ALKPHOSPHAT 70 04/16/2021 06:56 AM    ASTSGOT 25 04/16/2021 06:56 AM    ALTSGPT 22 04/16/2021 06:56 AM    TBILIRUBIN 0.4 04/16/2021 06:56 AM      Lab Results   Component Value Date/Time    WBC 5.8 10/30/2012 02:15 AM     Lab Results   Component Value Date/Time    HBA1C 5.5 04/22/2022 09:15 AM     No components found for: TROP      Cardiac Imaging and Procedures Review:      TTE 3/2017  CONCLUSIONS  Compared to the images of the study done 3/14/14; compared to the   report of the study done - there has been no significant change.   Normal left ventricular systolic function.  Left ventricular ejection fraction is visually estimated to be 60%.  Grade II diastolic dysfunction.  No significant valve abnormalities.   Estimated right ventricular systolic pressure is 20 mmHg.    Medical Decision Making:  Problem List Items Addressed This Visit     CAD (coronary artery disease)    Relevant Orders    Lipid Profile    Hyperlipidemia    Relevant Orders    Lipid Profile    S/P CABG (coronary artery bypass graft)    Essential hypertension        CAD / CABG - stable, asymptomatic. Continue aspirin and statin. Continue metoprolol.    HLD - Continue high intensity statin. Goal LDL <70 and trig <150. Repeat lipids 6 months if still not at goal increase atorvastatin.     HTN - controlled. Goal 120/80. Continue regimen.     It was my pleasure to meet with Mr. Arauz.

## 2022-09-25 DIAGNOSIS — I25.10 CORONARY ARTERY DISEASE INVOLVING NATIVE CORONARY ARTERY OF NATIVE HEART WITHOUT ANGINA PECTORIS: ICD-10-CM

## 2022-09-25 DIAGNOSIS — E78.00 PURE HYPERCHOLESTEROLEMIA: ICD-10-CM

## 2022-09-26 NOTE — TELEPHONE ENCOUNTER
Is the patient due for a refill? Yes    Was the patient seen the past year? Yes    Date of last office visit: 07/15/22    Does the patient have an upcoming appointment?  No       Provider to refill: VR    Does the patients insurance require a 100 day supply?  No

## 2022-09-27 RX ORDER — ATORVASTATIN CALCIUM 40 MG/1
TABLET, FILM COATED ORAL
Qty: 45 TABLET | Refills: 3 | Status: SHIPPED | OUTPATIENT
Start: 2022-09-27 | End: 2023-09-13

## 2022-11-04 SDOH — ECONOMIC STABILITY: HOUSING INSECURITY: IN THE LAST 12 MONTHS, HOW MANY PLACES HAVE YOU LIVED?: 1

## 2022-11-04 SDOH — ECONOMIC STABILITY: INCOME INSECURITY: IN THE LAST 12 MONTHS, WAS THERE A TIME WHEN YOU WERE NOT ABLE TO PAY THE MORTGAGE OR RENT ON TIME?: NO

## 2022-11-04 SDOH — ECONOMIC STABILITY: FOOD INSECURITY: WITHIN THE PAST 12 MONTHS, YOU WORRIED THAT YOUR FOOD WOULD RUN OUT BEFORE YOU GOT MONEY TO BUY MORE.: NEVER TRUE

## 2022-11-04 SDOH — HEALTH STABILITY: PHYSICAL HEALTH: ON AVERAGE, HOW MANY DAYS PER WEEK DO YOU ENGAGE IN MODERATE TO STRENUOUS EXERCISE (LIKE A BRISK WALK)?: 7 DAYS

## 2022-11-04 SDOH — ECONOMIC STABILITY: INCOME INSECURITY: HOW HARD IS IT FOR YOU TO PAY FOR THE VERY BASICS LIKE FOOD, HOUSING, MEDICAL CARE, AND HEATING?: NOT VERY HARD

## 2022-11-04 SDOH — HEALTH STABILITY: PHYSICAL HEALTH: ON AVERAGE, HOW MANY MINUTES DO YOU ENGAGE IN EXERCISE AT THIS LEVEL?: 40 MIN

## 2022-11-04 SDOH — HEALTH STABILITY: MENTAL HEALTH
STRESS IS WHEN SOMEONE FEELS TENSE, NERVOUS, ANXIOUS, OR CAN'T SLEEP AT NIGHT BECAUSE THEIR MIND IS TROUBLED. HOW STRESSED ARE YOU?: TO SOME EXTENT

## 2022-11-04 ASSESSMENT — LIFESTYLE VARIABLES
SKIP TO QUESTIONS 9-10: 1
HOW OFTEN DO YOU HAVE SIX OR MORE DRINKS ON ONE OCCASION: NEVER
AUDIT-C TOTAL SCORE: 2
HOW OFTEN DO YOU HAVE A DRINK CONTAINING ALCOHOL: 2-4 TIMES A MONTH
HOW MANY STANDARD DRINKS CONTAINING ALCOHOL DO YOU HAVE ON A TYPICAL DAY: 1 OR 2

## 2022-11-04 ASSESSMENT — SOCIAL DETERMINANTS OF HEALTH (SDOH)
HOW HARD IS IT FOR YOU TO PAY FOR THE VERY BASICS LIKE FOOD, HOUSING, MEDICAL CARE, AND HEATING?: NOT VERY HARD
HOW OFTEN DO YOU GET TOGETHER WITH FRIENDS OR RELATIVES?: ONCE A WEEK
HOW OFTEN DO YOU HAVE SIX OR MORE DRINKS ON ONE OCCASION: NEVER
IN A TYPICAL WEEK, HOW MANY TIMES DO YOU TALK ON THE PHONE WITH FAMILY, FRIENDS, OR NEIGHBORS?: THREE TIMES A WEEK
HOW OFTEN DO YOU ATTENT MEETINGS OF THE CLUB OR ORGANIZATION YOU BELONG TO?: MORE THAN 4 TIMES PER YEAR
WITHIN THE PAST 12 MONTHS, YOU WORRIED THAT YOUR FOOD WOULD RUN OUT BEFORE YOU GOT THE MONEY TO BUY MORE: NEVER TRUE
HOW OFTEN DO YOU ATTENT MEETINGS OF THE CLUB OR ORGANIZATION YOU BELONG TO?: MORE THAN 4 TIMES PER YEAR
HOW MANY DRINKS CONTAINING ALCOHOL DO YOU HAVE ON A TYPICAL DAY WHEN YOU ARE DRINKING: 1 OR 2
HOW OFTEN DO YOU GET TOGETHER WITH FRIENDS OR RELATIVES?: ONCE A WEEK
HOW OFTEN DO YOU ATTEND CHURCH OR RELIGIOUS SERVICES?: MORE THAN 4 TIMES PER YEAR
HOW OFTEN DO YOU ATTEND CHURCH OR RELIGIOUS SERVICES?: MORE THAN 4 TIMES PER YEAR
HOW OFTEN DO YOU HAVE A DRINK CONTAINING ALCOHOL: 2-4 TIMES A MONTH
IN A TYPICAL WEEK, HOW MANY TIMES DO YOU TALK ON THE PHONE WITH FAMILY, FRIENDS, OR NEIGHBORS?: THREE TIMES A WEEK

## 2022-11-07 ENCOUNTER — OFFICE VISIT (OUTPATIENT)
Dept: MEDICAL GROUP | Facility: PHYSICIAN GROUP | Age: 72
End: 2022-11-07
Payer: COMMERCIAL

## 2022-11-07 ENCOUNTER — HOSPITAL ENCOUNTER (OUTPATIENT)
Dept: LAB | Facility: MEDICAL CENTER | Age: 72
End: 2022-11-07
Attending: FAMILY MEDICINE
Payer: COMMERCIAL

## 2022-11-07 VITALS
HEIGHT: 74 IN | HEART RATE: 58 BPM | DIASTOLIC BLOOD PRESSURE: 66 MMHG | WEIGHT: 192 LBS | TEMPERATURE: 98.2 F | OXYGEN SATURATION: 99 % | SYSTOLIC BLOOD PRESSURE: 116 MMHG | RESPIRATION RATE: 18 BRPM | BODY MASS INDEX: 24.64 KG/M2

## 2022-11-07 DIAGNOSIS — Z23 NEED FOR VACCINATION: ICD-10-CM

## 2022-11-07 DIAGNOSIS — Z12.5 PROSTATE CANCER SCREENING: ICD-10-CM

## 2022-11-07 DIAGNOSIS — Z00.00 WELLNESS EXAMINATION: ICD-10-CM

## 2022-11-07 LAB — PSA SERPL-MCNC: 1.83 NG/ML (ref 0–4)

## 2022-11-07 PROCEDURE — 84153 ASSAY OF PSA TOTAL: CPT

## 2022-11-07 PROCEDURE — 90662 IIV NO PRSV INCREASED AG IM: CPT | Performed by: FAMILY MEDICINE

## 2022-11-07 PROCEDURE — 90471 IMMUNIZATION ADMIN: CPT | Performed by: FAMILY MEDICINE

## 2022-11-07 PROCEDURE — 99397 PER PM REEVAL EST PAT 65+ YR: CPT | Mod: 25 | Performed by: FAMILY MEDICINE

## 2022-11-07 PROCEDURE — 36415 COLL VENOUS BLD VENIPUNCTURE: CPT

## 2022-11-07 RX ORDER — SILDENAFIL 100 MG/1
100 TABLET, FILM COATED ORAL PRN
Qty: 10 TABLET | Refills: 5 | Status: SHIPPED | OUTPATIENT
Start: 2022-11-07 | End: 2022-11-21 | Stop reason: SDUPTHER

## 2022-11-07 ASSESSMENT — PATIENT HEALTH QUESTIONNAIRE - PHQ9: CLINICAL INTERPRETATION OF PHQ2 SCORE: 0

## 2022-11-07 NOTE — PROGRESS NOTES
Subjective:     CC: Patient is here today for his wellness exam.  No particular concerns.    HPI:   José presents today with the following medical issues:    Wellness examination  Patient is here today for his wellness exam.  His last cardiologist in July and everything was good although he wanted his LDL cholesterol to be a little lower.  His LDL was 72 and the cardiologist wants it below 70.  Patient is having no complaints on today's visit.  He would like to have his flu shot.    Past Medical History:   Diagnosis Date    ACS (acute coronary syndrome) (HCC) 10/24/2012    -10/2012 (Trop 0.3)     CAD (coronary artery disease) 01/04/2013    -s/p CABG 10/2012 (EF 60%); left internal mammary to left anterior descending, right saphenous vein graft to the first diagonal, saphenous vein graft to the most distal circumflex obtuse marginal branch, and saphenous vein graft to the right posterior descending  (small NSTEMI)      Heart attack ([facility])     Hypertension     Other and unspecified hyperlipidemia 01/04/2013       Social History     Tobacco Use    Smoking status: Never    Smokeless tobacco: Never   Vaping Use    Vaping Use: Never used   Substance Use Topics    Alcohol use: Yes     Comment: rare    Drug use: No       Current Outpatient Medications Ordered in Epic   Medication Sig Dispense Refill    sildenafil citrate (VIAGRA) 100 MG tablet Take 1 Tablet by mouth as needed for Erectile Dysfunction. No more than once a day 10 Tablet 5    atorvastatin (LIPITOR) 40 MG Tab TAKE 1/2 TABLET EVERY      EVENING 45 Tablet 3    lisinopril (PRINIVIL) 10 MG Tab TAKE 1 TABLET DAILY 90 Tablet 3    metoprolol tartrate (LOPRESSOR) 25 MG Tab TAKE 1 TABLET TWICE A  Tablet 3    Misc Natural Products (GLUCOSAMINE CHOND COMPLEX/MSM PO) Take  by mouth.      aspirin EC 81 MG EC tablet Take 1 Tablet by mouth every day. 30 Tab      No current Epic-ordered facility-administered medications on file.       Allergies:  Patient has no known  "allergies.    Health Maintenance: Completed    ROS:  Gen: no fevers/chills, no changes in weight  Eyes: no changes in vision  ENT: no sore throat, no hearing loss, no bloody nose  Pulm: no sob, no cough  CV: no chest pain, no palpitations  GI: no nausea/vomiting, no diarrhea  : no dysuria  MSk: no myalgias  Skin: no rash  Neuro: no headaches, no numbness/tingling  Heme/Lymph: no easy bruising      Objective:       Exam:  /66 (BP Location: Right arm, Patient Position: Sitting, BP Cuff Size: Adult)   Pulse (!) 58   Temp 36.8 °C (98.2 °F) (Temporal)   Resp 18   Ht 1.88 m (6' 2\")   Wt 87.1 kg (192 lb)   SpO2 99%   BMI 24.65 kg/m²  Body mass index is 24.65 kg/m².    Gen: Alert and oriented, No apparent distress.  Eyes:   Extraocular motions intact.  No scleral icterus seen.  Ears:    Ear canals and TMs are normal.  Neck: Neck is supple without lymphadenopathy.  Thyroid exam is normal.  Lungs: Normal effort, CTA bilaterally, no wheezes, rhonchi, or rales  CV: Regular rate and rhythm. No murmurs, rubs, or gallops.  No carotid bruits heard.  Abdomen: Soft, nontender, and organomegaly or masses.  Normal bowel sounds.  Ext: No clubbing, cyanosis, edema.  Neuro: Cranial nerves II through VIII are grossly intact.  No lateralized signs are seen.  Gait is normal.  Psych: Alert and orient x3.  No unusual thought process expressed.  Insight and judgment is good.    Labs: Reviewed and PSA ordered.    Assessment & Plan:     71 y.o. male with the following -     1. Need for vaccination  Flu vaccine given today.  We discussed the COVID booster and he may go get that at his pharmacy.  - Influenza Vaccine, High Dose (65+ Only)    2. Prostate cancer screening  PSA ordered for screening.  - PROSTATE SPECIFIC AG SCREENING; Future    3. Wellness examination  Patient's wellness exam was normal.  He wanted a refill on his Viagra at the higher dose and this was given.  Follow-up in 1 year.      Return in about 1 year (around " 11/7/2023) for annual exam.    Please note that this dictation was created using voice recognition software. I have made every reasonable attempt to correct obvious errors, but I expect that there are errors of grammar and possibly content that I did not discover before finalizing the note.

## 2022-11-07 NOTE — ASSESSMENT & PLAN NOTE
Patient is here today for his wellness exam.  His last cardiologist in July and everything was good although he wanted his LDL cholesterol to be a little lower.  His LDL was 72 and the cardiologist wants it below 70.  Patient is having no complaints on today's visit.  He would like to have his flu shot.

## 2022-11-21 RX ORDER — SILDENAFIL 100 MG/1
100 TABLET, FILM COATED ORAL PRN
Qty: 10 TABLET | Refills: 5 | Status: SHIPPED | OUTPATIENT
Start: 2022-11-21 | End: 2024-03-05

## 2022-11-30 ENCOUNTER — OFFICE VISIT (OUTPATIENT)
Dept: DERMATOLOGY | Facility: IMAGING CENTER | Age: 72
End: 2022-11-30
Payer: COMMERCIAL

## 2022-11-30 DIAGNOSIS — Z12.83 SKIN CANCER SCREENING: ICD-10-CM

## 2022-11-30 DIAGNOSIS — L73.8 SEBACEOUS HYPERPLASIA: ICD-10-CM

## 2022-11-30 DIAGNOSIS — L82.1 SK (SEBORRHEIC KERATOSIS): ICD-10-CM

## 2022-11-30 DIAGNOSIS — L81.4 LENTIGINES: ICD-10-CM

## 2022-11-30 DIAGNOSIS — D22.9 MULTIPLE NEVI: ICD-10-CM

## 2022-11-30 DIAGNOSIS — D18.01 CHERRY ANGIOMA: ICD-10-CM

## 2022-11-30 PROCEDURE — 99213 OFFICE O/P EST LOW 20 MIN: CPT | Performed by: NURSE PRACTITIONER

## 2022-11-30 NOTE — PROGRESS NOTES
DERMATOLOGY NOTE  NEW VISIT       Chief complaint: Establish Care (AGNES)   Denies new, growing, changing, itching or bleeding skin lesions today.      History of skin cancer: No  History of precancers/actinic keratoses: No  History of biopsies:No  History of blistering/severe sunburns:No  Family history of skin cancer:No  Family history of atypical moles:No      No Known Allergies     MEDICATIONS:  Medications relevant to specialty reviewed.     REVIEW OF SYSTEMS:   Positive for skin (see HPI)  Negative for fevers and chills       EXAM:  There were no vitals taken for this visit.  Constitutional: Well-developed, well-nourished, and in no distress.     A total body skin exam was performed excluding the genitals per patient preference and including the following areas: head (including face), neck, chest, abdomen, groin/buttocks, back, bilateral upper extremities, and bilateral lower extremities with the following pertinent findings listed below and/or in assessment/plan.     -sun exposed skin of trunk and b/l upper, lower extremities and face with scattered clinically benign light brown reticulated macules all of which were morphologically similar and none of which were suspicious for skin cancer today on exam    -Several scattered 1-3mm bright red macules and thin papules on the trunk and extremities    -Multiple tan medium brown skin-colored macules papules scattered over the trunk >> extremities--All with benign-appearing pigment network patterns on dermoscopy    -Several tan medium brown dark brown stuck-on waxy papules scattered on the trunk and extremities    -Few scattered sebaceous hyperplasia on face    IMPRESSION / PLAN:    1. Lentigines  - Benign-appearing nature of lesions discussed during exam.   - Advised to continue to monitor for any return to clinic for new or concerning changes.      2. Cherry angioma  - Benign-appearing nature of lesions discussed during exam.   - Advised to continue to monitor for  any return to clinic for new or concerning changes.      3. SK (seborrheic keratosis)  - Benign-appearing nature of lesions discussed during exam.   - Advised to continue to monitor for any return to clinic for new or concerning changes.      4. Multiple nevi  - Benign-appearing nature of lesions discussed during exam.   - Advised to continue to monitor for any return to clinic for new or concerning changes.  - ABCDE's of melanoma discussed      5. Sebaceous hyperplasia  - Benign-appearing nature of lesions discussed during exam.   - Advised to continue to monitor for any return to clinic for new or concerning changes.      6. Skin cancer screening  Skin cancer education  discussed importance of sun protective clothing, eyewear in addition to the use of broad spectrum sunscreen with SPF 30 or greater, as well as need for reapplication ~every 2 hours when exposed to UVR  discussed importance following up for any new or changing lesions as noted in handout given, but every 12 months exams in clinic in the setting of dermatologic history  ABCDE's of melanoma discussed/handout given              Please note that this dictation was created using voice recognition software. I have made every reasonable attempt to correct obvious errors, but I expect that there are errors of grammar and possibly content that I did not discover before finalizing the note.      Return to clinic in: Return in about 1 year (around 11/30/2023) for AGNES. and as needed for any new or changing skin lesions.

## 2023-04-04 ENCOUNTER — PATIENT MESSAGE (OUTPATIENT)
Dept: CARDIOLOGY | Facility: MEDICAL CENTER | Age: 73
End: 2023-04-04
Payer: COMMERCIAL

## 2023-06-19 ENCOUNTER — PATIENT MESSAGE (OUTPATIENT)
Dept: CARDIOLOGY | Facility: MEDICAL CENTER | Age: 73
End: 2023-06-19
Payer: COMMERCIAL

## 2023-06-30 ENCOUNTER — HOSPITAL ENCOUNTER (OUTPATIENT)
Dept: LAB | Facility: MEDICAL CENTER | Age: 73
End: 2023-06-30
Attending: INTERNAL MEDICINE
Payer: COMMERCIAL

## 2023-06-30 DIAGNOSIS — I25.10 CORONARY ARTERY DISEASE INVOLVING NATIVE CORONARY ARTERY OF NATIVE HEART WITHOUT ANGINA PECTORIS: ICD-10-CM

## 2023-06-30 DIAGNOSIS — E78.00 PURE HYPERCHOLESTEROLEMIA: ICD-10-CM

## 2023-06-30 LAB
CHOLEST SERPL-MCNC: 145 MG/DL (ref 100–199)
FASTING STATUS PATIENT QL REPORTED: NORMAL
HDLC SERPL-MCNC: 69 MG/DL
LDLC SERPL CALC-MCNC: 66 MG/DL
TRIGL SERPL-MCNC: 51 MG/DL (ref 0–149)

## 2023-06-30 PROCEDURE — 36415 COLL VENOUS BLD VENIPUNCTURE: CPT

## 2023-06-30 PROCEDURE — 80061 LIPID PANEL: CPT

## 2023-07-03 ENCOUNTER — OFFICE VISIT (OUTPATIENT)
Dept: CARDIOLOGY | Facility: MEDICAL CENTER | Age: 73
End: 2023-07-03
Attending: INTERNAL MEDICINE
Payer: COMMERCIAL

## 2023-07-03 VITALS
SYSTOLIC BLOOD PRESSURE: 112 MMHG | OXYGEN SATURATION: 98 % | HEART RATE: 51 BPM | BODY MASS INDEX: 24.9 KG/M2 | RESPIRATION RATE: 16 BRPM | WEIGHT: 194 LBS | DIASTOLIC BLOOD PRESSURE: 64 MMHG | HEIGHT: 74 IN

## 2023-07-03 DIAGNOSIS — I25.9 ISCHEMIC HEART DISEASE DUE TO CORONARY ARTERY OBSTRUCTION (HCC): ICD-10-CM

## 2023-07-03 DIAGNOSIS — E78.00 PURE HYPERCHOLESTEROLEMIA: ICD-10-CM

## 2023-07-03 DIAGNOSIS — Z95.1 S/P CABG (CORONARY ARTERY BYPASS GRAFT): ICD-10-CM

## 2023-07-03 DIAGNOSIS — I24.0 ISCHEMIC HEART DISEASE DUE TO CORONARY ARTERY OBSTRUCTION (HCC): ICD-10-CM

## 2023-07-03 DIAGNOSIS — I10 ESSENTIAL HYPERTENSION: ICD-10-CM

## 2023-07-03 PROCEDURE — 3074F SYST BP LT 130 MM HG: CPT | Performed by: INTERNAL MEDICINE

## 2023-07-03 PROCEDURE — 99215 OFFICE O/P EST HI 40 MIN: CPT | Performed by: INTERNAL MEDICINE

## 2023-07-03 PROCEDURE — 3078F DIAST BP <80 MM HG: CPT | Performed by: INTERNAL MEDICINE

## 2023-07-03 PROCEDURE — 99212 OFFICE O/P EST SF 10 MIN: CPT | Performed by: INTERNAL MEDICINE

## 2023-07-03 ASSESSMENT — ENCOUNTER SYMPTOMS
WEIGHT LOSS: 0
ALTERED MENTAL STATUS: 0
CLAUDICATION: 0
NAUSEA: 0
FEVER: 0
DIARRHEA: 0
PALPITATIONS: 0
COUGH: 0
CONSTIPATION: 0
FLANK PAIN: 0
BLURRED VISION: 0
SYNCOPE: 0
NEAR-SYNCOPE: 0
BACK PAIN: 0
DIZZINESS: 0
DECREASED APPETITE: 0
SHORTNESS OF BREATH: 0
ORTHOPNEA: 0
VOMITING: 0
DYSPNEA ON EXERTION: 0
HEARTBURN: 0
WEIGHT GAIN: 0
PND: 0
DEPRESSION: 0
ABDOMINAL PAIN: 0
IRREGULAR HEARTBEAT: 0

## 2023-07-03 NOTE — PROGRESS NOTES
Cardiology Note    Chief Complaint   Patient presents with    Hyperlipidemia     F/V Dx: Pure hypercholesterolemia    Coronary Artery Bypass Graft (CABG)     F/V Dx: S/P CABG (coronary artery bypass graft)       History of Present Illness: José Arauz is a 72 y.o. male PMH CAD/CABG 2012, HLD, HTN who presents for follow up.    Doing well since last visit. No active cardiac complaints. He is wondering if truly has hypertension. Appears he is medicated for this and well controlled. Has been active without cardiovascular limitations. Compliant with medications and denies adverse effects.     Review of Systems   Constitutional: Negative for decreased appetite, fever, malaise/fatigue, weight gain and weight loss.   HENT:  Negative for congestion and nosebleeds.    Eyes:  Negative for blurred vision.   Cardiovascular:  Negative for chest pain, claudication, dyspnea on exertion, irregular heartbeat, leg swelling, near-syncope, orthopnea, palpitations, paroxysmal nocturnal dyspnea and syncope.   Respiratory:  Negative for cough and shortness of breath.    Endocrine: Negative for cold intolerance and heat intolerance.   Skin:  Negative for rash.   Musculoskeletal:  Negative for back pain.   Gastrointestinal:  Negative for abdominal pain, constipation, diarrhea, heartburn, melena, nausea and vomiting.   Genitourinary:  Negative for dysuria, flank pain and hematuria.   Neurological:  Negative for dizziness.   Psychiatric/Behavioral:  Negative for altered mental status and depression.          Past Medical History:   Diagnosis Date    ACS (acute coronary syndrome) (HCC) 10/24/2012    -10/2012 (Trop 0.3)     CAD (coronary artery disease) 01/04/2013    -s/p CABG 10/2012 (EF 60%); left internal mammary to left anterior descending, right saphenous vein graft to the first diagonal, saphenous vein graft to the most distal circumflex obtuse marginal branch, and saphenous vein graft to the right posterior descending  (small  NSTEMI)      Heart attack (HCC)     Hypertension     Other and unspecified hyperlipidemia 01/04/2013         Past Surgical History:   Procedure Laterality Date    MULTIPLE CORONARY ARTERY BYPASS ENDO VEIN HARVEST  10/25/2012    Performed by Abdi Worthington M.D. at SURGERY [facility] ORS    OTHER  01/01/1975    tonsillectomy    OTHER  33 yrs ago    vasectomy    VASECTOMY           Current Outpatient Medications   Medication Sig Dispense Refill    sildenafil citrate (VIAGRA) 100 MG tablet Take 1 Tablet by mouth as needed for Erectile Dysfunction. No more than once a day 10 Tablet 5    atorvastatin (LIPITOR) 40 MG Tab TAKE 1/2 TABLET EVERY      EVENING 45 Tablet 3    lisinopril (PRINIVIL) 10 MG Tab TAKE 1 TABLET DAILY 90 Tablet 3    metoprolol tartrate (LOPRESSOR) 25 MG Tab TAKE 1 TABLET TWICE A  Tablet 3    Misc Natural Products (GLUCOSAMINE CHOND COMPLEX/MSM PO) Take  by mouth.      aspirin EC 81 MG EC tablet Take 1 Tablet by mouth every day. 30 Tab      No current facility-administered medications for this visit.         No Known Allergies      Family History   Problem Relation Age of Onset    Cancer Father         lung cancer    Heart Disease Neg Hx     Heart Failure Neg Hx          Social History     Socioeconomic History    Marital status:      Spouse name: Not on file    Number of children: Not on file    Years of education: Not on file    Highest education level: Associate degree: academic program   Occupational History    Not on file   Tobacco Use    Smoking status: Never    Smokeless tobacco: Never   Vaping Use    Vaping Use: Never used   Substance and Sexual Activity    Alcohol use: Yes     Comment: rare    Drug use: No    Sexual activity: Yes     Partners: Female     Birth control/protection: Male Sterilization   Other Topics Concern    Not on file   Social History Narrative    Not on file     Social Determinants of Health     Financial Resource Strain: Low Risk  (11/4/2022)    Overall  "Financial Resource Strain (CARDIA)     Difficulty of Paying Living Expenses: Not very hard   Food Insecurity: Unknown (11/4/2022)    Hunger Vital Sign     Worried About Running Out of Food in the Last Year: Never true     Ran Out of Food in the Last Year: Not on file   Transportation Needs: No Transportation Needs (11/4/2022)    PRAPARE - Transportation     Lack of Transportation (Medical): No     Lack of Transportation (Non-Medical): No   Physical Activity: Sufficiently Active (11/4/2022)    Exercise Vital Sign     Days of Exercise per Week: 7 days     Minutes of Exercise per Session: 40 min   Stress: Stress Concern Present (11/4/2022)    [employer] - Occupational Stress Questionnaire     Feeling of Stress : To some extent   Social Connections: Socially Integrated (11/4/2022)    Social Connection and Isolation Panel [NHANES]     Frequency of Communication with Friends and Family: Three times a week     Frequency of Social Gatherings with Friends and Family: Once a week     Attends Synagogue Services: More than 4 times per year     Active Member of Clubs or Organizations: Not on file     Attends Club or Organization Meetings: More than 4 times per year     Marital Status:    Intimate Partner Violence: Not on file   Housing Stability: Low Risk  (11/4/2022)    Housing Stability Vital Sign     Unable to Pay for Housing in the Last Year: No     Number of Places Lived in the Last Year: 1     Unstable Housing in the Last Year: No         Physical Exam:  Ambulatory Vitals  /64 (BP Location: Left arm, Patient Position: Sitting, BP Cuff Size: Adult)   Pulse (!) 51   Resp 16   Ht 1.88 m (6' 2\")   Wt 88 kg (194 lb)   SpO2 98%    BP Readings from Last 4 Encounters:   07/03/23 112/64   11/07/22 116/66   07/15/22 118/64   06/07/21 128/68     Weight/BMI:   Vitals:    07/03/23 0825   BP: 112/64   Weight: 88 kg (194 lb)   Height: 1.88 m (6' 2\")    Body mass index is 24.91 kg/m².  Wt " Readings from Last 4 Encounters:   07/03/23 88 kg (194 lb)   11/07/22 87.1 kg (192 lb)   07/15/22 87.5 kg (193 lb)   06/07/21 85.3 kg (188 lb)       Physical Exam  Constitutional:       General: He is not in acute distress.  HENT:      Head: Normocephalic and atraumatic.   Eyes:      Conjunctiva/sclera: Conjunctivae normal.      Pupils: Pupils are equal, round, and reactive to light.   Neck:      Vascular: No JVD.   Cardiovascular:      Rate and Rhythm: Normal rate and regular rhythm.      Heart sounds: Normal heart sounds. No murmur heard.     No friction rub. No gallop.   Pulmonary:      Effort: Pulmonary effort is normal. No respiratory distress.      Breath sounds: Normal breath sounds. No wheezing or rales.   Chest:      Chest wall: No tenderness.   Abdominal:      General: Bowel sounds are normal. There is no distension.      Palpations: Abdomen is soft.   Musculoskeletal:      Cervical back: Normal range of motion and neck supple.   Skin:     General: Skin is warm and dry.   Neurological:      Mental Status: He is alert and oriented to person, place, and time.   Psychiatric:         Mood and Affect: Affect normal.         Judgment: Judgment normal.         Lab Data Review:  Lab Results   Component Value Date/Time    CHOLSTRLTOT 145 06/30/2023 07:58 AM    LDL 66 06/30/2023 07:58 AM    HDL 69 06/30/2023 07:58 AM    TRIGLYCERIDE 51 06/30/2023 07:58 AM       Lab Results   Component Value Date/Time    SODIUM 142 04/22/2022 09:15 AM    POTASSIUM 4.4 04/22/2022 09:15 AM    CHLORIDE 106 04/22/2022 09:15 AM    CO2 25 04/22/2022 09:15 AM    GLUCOSE 98 04/22/2022 09:15 AM    BUN 17 04/22/2022 09:15 AM    CREATININE 1.03 04/22/2022 09:15 AM     CrCl cannot be calculated (Patient's most recent lab result is older than the maximum 7 days allowed.).  Lab Results   Component Value Date/Time    ALKPHOSPHAT 70 04/16/2021 06:56 AM    ASTSGOT 25 04/16/2021 06:56 AM    ALTSGPT 22 04/16/2021 06:56 AM    TBILIRUBIN 0.4 04/16/2021  06:56 AM      Lab Results   Component Value Date/Time    WBC 5.8 10/30/2012 02:15 AM     Lab Results   Component Value Date/Time    HBA1C 5.5 04/22/2022 09:15 AM     No components found for: TROP      Cardiac Imaging and Procedures Review:      TTE 3/2017  CONCLUSIONS  Compared to the images of the study done 3/14/14; compared to the   report of the study done - there has been no significant change.   Normal left ventricular systolic function.  Left ventricular ejection fraction is visually estimated to be 60%.  Grade II diastolic dysfunction.  No significant valve abnormalities.   Estimated right ventricular systolic pressure is 20 mmHg.    Medical Decision Making:  Problem List Items Addressed This Visit       Ischemic heart disease due to coronary artery obstruction (HCC)    Hyperlipidemia    S/P CABG (coronary artery bypass graft)    Essential hypertension     CAD / CABG - stable, asymptomatic. Continue aspirin and statin. Continue metoprolol.    HLD - controlled. Continue high intensity statin. Goal LDL <70 and trig <150. Annual lipids with PCP.     HTN - controlled. Goal 120/80. Continue regimen. He would like to retest this. Can attempt to check BP at home for one week. Can then guide and hold lisinopril for a week and check BP. Compare difference. Resume antihypertensives if BP should be uncontrolled at any point.     It was my pleasure to meet with  Regla.    A total of 65 minutes of time was spent on day of encounter reviewing medical record, performing history and examination, counseling, ordering medication/test/consults and documentation.

## 2023-08-13 DIAGNOSIS — I10 ESSENTIAL HYPERTENSION: ICD-10-CM

## 2023-08-15 RX ORDER — LISINOPRIL 10 MG/1
TABLET ORAL
Qty: 90 TABLET | Refills: 3 | Status: SHIPPED | OUTPATIENT
Start: 2023-08-15

## 2023-09-13 DIAGNOSIS — I25.10 CORONARY ARTERY DISEASE INVOLVING NATIVE CORONARY ARTERY OF NATIVE HEART WITHOUT ANGINA PECTORIS: ICD-10-CM

## 2023-09-13 DIAGNOSIS — E78.00 PURE HYPERCHOLESTEROLEMIA: ICD-10-CM

## 2023-09-13 RX ORDER — ATORVASTATIN CALCIUM 40 MG/1
TABLET, FILM COATED ORAL
Qty: 45 TABLET | Refills: 3 | Status: SHIPPED | OUTPATIENT
Start: 2023-09-13

## 2023-09-13 NOTE — TELEPHONE ENCOUNTER
Is the patient due for a refill? Yes    Was the patient seen the past year? Yes    Date of last office visit: 7/3/2023    Does the patient have an upcoming appointment?  No   If yes, When?     Provider to refill:VR    Does the patients insurance require a 100 day supply?  No

## 2024-03-03 SDOH — ECONOMIC STABILITY: INCOME INSECURITY: IN THE LAST 12 MONTHS, WAS THERE A TIME WHEN YOU WERE NOT ABLE TO PAY THE MORTGAGE OR RENT ON TIME?: NO

## 2024-03-03 SDOH — ECONOMIC STABILITY: INCOME INSECURITY: HOW HARD IS IT FOR YOU TO PAY FOR THE VERY BASICS LIKE FOOD, HOUSING, MEDICAL CARE, AND HEATING?: NOT HARD AT ALL

## 2024-03-03 SDOH — ECONOMIC STABILITY: FOOD INSECURITY: WITHIN THE PAST 12 MONTHS, THE FOOD YOU BOUGHT JUST DIDN'T LAST AND YOU DIDN'T HAVE MONEY TO GET MORE.: NEVER TRUE

## 2024-03-03 SDOH — HEALTH STABILITY: PHYSICAL HEALTH: ON AVERAGE, HOW MANY DAYS PER WEEK DO YOU ENGAGE IN MODERATE TO STRENUOUS EXERCISE (LIKE A BRISK WALK)?: 7 DAYS

## 2024-03-03 SDOH — ECONOMIC STABILITY: HOUSING INSECURITY

## 2024-03-03 SDOH — HEALTH STABILITY: PHYSICAL HEALTH: ON AVERAGE, HOW MANY MINUTES DO YOU ENGAGE IN EXERCISE AT THIS LEVEL?: 50 MIN

## 2024-03-03 ASSESSMENT — SOCIAL DETERMINANTS OF HEALTH (SDOH)
HOW OFTEN DO YOU ATTEND CHURCH OR RELIGIOUS SERVICES?: MORE THAN 4 TIMES PER YEAR
DO YOU BELONG TO ANY CLUBS OR ORGANIZATIONS SUCH AS CHURCH GROUPS UNIONS, FRATERNAL OR ATHLETIC GROUPS, OR SCHOOL GROUPS?: YES
HOW OFTEN DO YOU ATTENT MEETINGS OF THE CLUB OR ORGANIZATION YOU BELONG TO?: MORE THAN 4 TIMES PER YEAR
HOW OFTEN DO YOU ATTENT MEETINGS OF THE CLUB OR ORGANIZATION YOU BELONG TO?: MORE THAN 4 TIMES PER YEAR
HOW OFTEN DO YOU ATTEND CHURCH OR RELIGIOUS SERVICES?: MORE THAN 4 TIMES PER YEAR
HOW OFTEN DO YOU HAVE SIX OR MORE DRINKS ON ONE OCCASION: NEVER
HOW OFTEN DO YOU GET TOGETHER WITH FRIENDS OR RELATIVES?: ONCE A WEEK
HOW HARD IS IT FOR YOU TO PAY FOR THE VERY BASICS LIKE FOOD, HOUSING, MEDICAL CARE, AND HEATING?: NOT HARD AT ALL
IN A TYPICAL WEEK, HOW MANY TIMES DO YOU TALK ON THE PHONE WITH FAMILY, FRIENDS, OR NEIGHBORS?: TWICE A WEEK
HOW OFTEN DO YOU HAVE A DRINK CONTAINING ALCOHOL: 2-4 TIMES A MONTH
IN A TYPICAL WEEK, HOW MANY TIMES DO YOU TALK ON THE PHONE WITH FAMILY, FRIENDS, OR NEIGHBORS?: TWICE A WEEK
DO YOU BELONG TO ANY CLUBS OR ORGANIZATIONS SUCH AS CHURCH GROUPS UNIONS, FRATERNAL OR ATHLETIC GROUPS, OR SCHOOL GROUPS?: YES
HOW OFTEN DO YOU GET TOGETHER WITH FRIENDS OR RELATIVES?: ONCE A WEEK

## 2024-03-03 ASSESSMENT — LIFESTYLE VARIABLES
HOW OFTEN DO YOU HAVE A DRINK CONTAINING ALCOHOL: 2-4 TIMES A MONTH
HOW OFTEN DO YOU HAVE SIX OR MORE DRINKS ON ONE OCCASION: NEVER

## 2024-03-05 ENCOUNTER — OFFICE VISIT (OUTPATIENT)
Dept: MEDICAL GROUP | Facility: PHYSICIAN GROUP | Age: 74
End: 2024-03-05
Payer: COMMERCIAL

## 2024-03-05 VITALS
HEIGHT: 74 IN | OXYGEN SATURATION: 97 % | SYSTOLIC BLOOD PRESSURE: 122 MMHG | DIASTOLIC BLOOD PRESSURE: 68 MMHG | TEMPERATURE: 98.3 F | WEIGHT: 189 LBS | RESPIRATION RATE: 18 BRPM | HEART RATE: 58 BPM | BODY MASS INDEX: 24.26 KG/M2

## 2024-03-05 DIAGNOSIS — Z00.00 WELLNESS EXAMINATION: ICD-10-CM

## 2024-03-05 DIAGNOSIS — I10 ESSENTIAL HYPERTENSION: ICD-10-CM

## 2024-03-05 DIAGNOSIS — Z12.5 PROSTATE CANCER SCREENING: ICD-10-CM

## 2024-03-05 PROCEDURE — 3078F DIAST BP <80 MM HG: CPT | Performed by: FAMILY MEDICINE

## 2024-03-05 PROCEDURE — 3074F SYST BP LT 130 MM HG: CPT | Performed by: FAMILY MEDICINE

## 2024-03-05 PROCEDURE — 99397 PER PM REEVAL EST PAT 65+ YR: CPT | Performed by: FAMILY MEDICINE

## 2024-03-05 RX ORDER — TADALAFIL 20 MG/1
20 TABLET ORAL PRN
Qty: 10 TABLET | Refills: 3 | Status: SHIPPED | OUTPATIENT
Start: 2024-03-05

## 2024-03-05 ASSESSMENT — PATIENT HEALTH QUESTIONNAIRE - PHQ9: CLINICAL INTERPRETATION OF PHQ2 SCORE: 0

## 2024-03-05 NOTE — PROGRESS NOTES
Subjective:     CC: Here for his wellness exam.    HPI:   José presents today with the following medical concerns:    Wellness examination  Here today for his wellness exam.  Also due for his annual labs.  Overall he states he is feeling very well although he had COVID back in November.  He states it lasted about a week and then he recovered.  A month later starting in December he started getting dizziness and vertigo.  That lasted several days and since it has waxed and waned.  Seems to be worse when he lays down in bed and he has a few seconds of vertigo that resolves after shutting his eyes.  His ears also feel congested but no hearing loss.  No other changes.    Essential hypertension  This is a chronic stable condition.  He is followed by cardiology.    Past Medical History:   Diagnosis Date    ACS (acute coronary syndrome) (HCC) 10/24/2012    -10/2012 (Trop 0.3)     CAD (coronary artery disease) 01/04/2013    -s/p CABG 10/2012 (EF 60%); left internal mammary to left anterior descending, right saphenous vein graft to the first diagonal, saphenous vein graft to the most distal circumflex obtuse marginal branch, and saphenous vein graft to the right posterior descending  (small NSTEMI)      Heart attack ([facility])     Hypertension     Other and unspecified hyperlipidemia 01/04/2013       Social History     Tobacco Use    Smoking status: Never    Smokeless tobacco: Never   Vaping Use    Vaping Use: Never used   Substance Use Topics    Alcohol use: Yes     Comment: rare    Drug use: No       Current Outpatient Medications Ordered in Epic   Medication Sig Dispense Refill    tadalafil (CIALIS) 20 MG tablet Take 1 Tablet by mouth as needed for Erectile Dysfunction. No more than every other day 10 Tablet 3    atorvastatin (LIPITOR) 40 MG Tab TAKE 1/2 TABLET EVERY      EVENING 45 Tablet 3    metoprolol tartrate (LOPRESSOR) 25 MG Tab TAKE 1 TABLET TWICE A  Tablet 3    lisinopril (PRINIVIL) 10 MG Tab TAKE 1 TABLET  "DAILY 90 Tablet 3    Misc Natural Products (GLUCOSAMINE CHOND COMPLEX/MSM PO) Take  by mouth.      aspirin EC 81 MG EC tablet Take 1 Tablet by mouth every day. 30 Tab      No current Epic-ordered facility-administered medications on file.       Allergies:  Patient has no known allergies.    Health Maintenance: Completed    ROS:  Gen: no fevers/chills, no changes in weight  Eyes: no changes in vision  ENT: no sore throat, no hearing loss, no bloody nose  Pulm: no sob, no cough  CV: no chest pain, no palpitations  GI: no nausea/vomiting, no diarrhea  : no dysuria  MSk: no myalgias  Skin: no rash  Neuro: no headaches, no numbness/tingling  Heme/Lymph: no easy bruising      Objective:       Exam:  /68 (BP Location: Left arm, Patient Position: Sitting, BP Cuff Size: Adult)   Pulse (!) 58   Temp 36.8 °C (98.3 °F) (Temporal)   Resp 18   Ht 1.88 m (6' 2\")   Wt 85.7 kg (189 lb)   SpO2 97%   BMI 24.27 kg/m²  Body mass index is 24.27 kg/m².    Gen: Alert and oriented, No apparent distress.  Eyes:   Extraocular motions intact.  No nystagmus.  No scleral icterus.  ENT:    Ear canals and TMs normal.  Nose has some slight congestion on the right.  No drainage is seen.  Throat is clear.  Neck: Neck is supple without lymphadenopathy.  Thyroid exam is normal.  Lungs: Normal effort, CTA bilaterally, no wheezes, rhonchi, or rales  CV: Regular rate and rhythm. No murmurs, rubs, or gallops.  No carotid bruits heard.  Abdomen: Soft, nontender, no organomegaly or masses.  Ext: No clubbing, cyanosis, edema.  Neuro: Cranial nerves II through VIII are grossly intact.  No lateralizing signs are seen.  Gait is normal.  Skin:    Patient has a couple of benign-appearing seborrheic keratosis to his abdomen.    Labs: Reviewed and ordered    Assessment & Plan:     73 y.o. male with the following -     1. Essential hypertension  This is a chronic stable condition.  Continue current medications.  Labs ordered.  - Comp Metabolic Panel; " Future  - Lipid Profile; Future  - URINALYSIS,CULTURE IF INDICATED; Future    2. Wellness examination  Patient's wellness exam today was unremarkable.  General health Care issues addressed.  I talked to him about his momentary vertigo when laying down and told him it may be the residual of a viral labyrinthitis that is slowly going away.  He also could try an over-the-counter antihistamine or and Flonase nasal spray to see if that helps with his congestion.  If he has any continuation or recurrence of symptoms return to clinic.  - Comp Metabolic Panel; Future  - Lipid Profile; Future  - PROSTATE SPECIFIC AG SCREENING; Future  - URINALYSIS,CULTURE IF INDICATED; Future  - CBC WITH DIFFERENTIAL; Future  - ESTIMATED GFR; Future    3. Prostate cancer screening  This is a screening issue.    - PROSTATE SPECIFIC AG SCREENING; Future  Anticipatory guidance: Patient talked about healthy living and lifestyle.  As well as baseline labs.  Return in about 1 year (around 3/5/2025) for annual exam.    Please note that this dictation was created using voice recognition software. I have made every reasonable attempt to correct obvious errors, but I expect that there are errors of grammar and possibly content that I did not discover before finalizing the note.

## 2024-03-05 NOTE — ASSESSMENT & PLAN NOTE
Here today for his wellness exam.  Also due for his annual labs.  Overall he states he is feeling very well although he had COVID back in November.  He states it lasted about a week and then he recovered.  A month later starting in December he started getting dizziness and vertigo.  That lasted several days and since it has waxed and waned.  Seems to be worse when he lays down in bed and he has a few seconds of vertigo that resolves after shutting his eyes.  His ears also feel congested but no hearing loss.  No other changes.

## 2024-04-04 ENCOUNTER — HOSPITAL ENCOUNTER (OUTPATIENT)
Dept: LAB | Facility: MEDICAL CENTER | Age: 74
End: 2024-04-04
Attending: FAMILY MEDICINE
Payer: COMMERCIAL

## 2024-04-04 DIAGNOSIS — Z00.00 WELLNESS EXAMINATION: ICD-10-CM

## 2024-04-04 DIAGNOSIS — I10 ESSENTIAL HYPERTENSION: ICD-10-CM

## 2024-04-04 DIAGNOSIS — Z12.5 PROSTATE CANCER SCREENING: ICD-10-CM

## 2024-04-04 LAB
ALBUMIN SERPL BCP-MCNC: 4.2 G/DL (ref 3.2–4.9)
ALBUMIN/GLOB SERPL: 1.4 G/DL
ALP SERPL-CCNC: 78 U/L (ref 30–99)
ALT SERPL-CCNC: 16 U/L (ref 2–50)
ANION GAP SERPL CALC-SCNC: 12 MMOL/L (ref 7–16)
APPEARANCE UR: CLEAR
AST SERPL-CCNC: 36 U/L (ref 12–45)
BASOPHILS # BLD AUTO: 0.7 % (ref 0–1.8)
BASOPHILS # BLD: 0.04 K/UL (ref 0–0.12)
BILIRUB SERPL-MCNC: 1 MG/DL (ref 0.1–1.5)
BILIRUB UR QL STRIP.AUTO: NEGATIVE
BUN SERPL-MCNC: 18 MG/DL (ref 8–22)
CALCIUM ALBUM COR SERPL-MCNC: 9.5 MG/DL (ref 8.5–10.5)
CALCIUM SERPL-MCNC: 9.7 MG/DL (ref 8.5–10.5)
CHLORIDE SERPL-SCNC: 107 MMOL/L (ref 96–112)
CHOLEST SERPL-MCNC: 162 MG/DL (ref 100–199)
CO2 SERPL-SCNC: 22 MMOL/L (ref 20–33)
COLOR UR: YELLOW
CREAT SERPL-MCNC: 1.06 MG/DL (ref 0.5–1.4)
EOSINOPHIL # BLD AUTO: 0.27 K/UL (ref 0–0.51)
EOSINOPHIL NFR BLD: 4.9 % (ref 0–6.9)
ERYTHROCYTE [DISTWIDTH] IN BLOOD BY AUTOMATED COUNT: 44.8 FL (ref 35.9–50)
GFR SERPLBLD CREATININE-BSD FMLA CKD-EPI: 73 ML/MIN/1.73 M 2
GLOBULIN SER CALC-MCNC: 2.9 G/DL (ref 1.9–3.5)
GLUCOSE SERPL-MCNC: 102 MG/DL (ref 65–99)
GLUCOSE UR STRIP.AUTO-MCNC: NEGATIVE MG/DL
HCT VFR BLD AUTO: 43.8 % (ref 42–52)
HDLC SERPL-MCNC: 72 MG/DL
HGB BLD-MCNC: 14.5 G/DL (ref 14–18)
IMM GRANULOCYTES # BLD AUTO: 0.01 K/UL (ref 0–0.11)
IMM GRANULOCYTES NFR BLD AUTO: 0.2 % (ref 0–0.9)
KETONES UR STRIP.AUTO-MCNC: ABNORMAL MG/DL
LDLC SERPL CALC-MCNC: 80 MG/DL
LEUKOCYTE ESTERASE UR QL STRIP.AUTO: NEGATIVE
LYMPHOCYTES # BLD AUTO: 2.12 K/UL (ref 1–4.8)
LYMPHOCYTES NFR BLD: 38.6 % (ref 22–41)
MCH RBC QN AUTO: 31 PG (ref 27–33)
MCHC RBC AUTO-ENTMCNC: 33.1 G/DL (ref 32.3–36.5)
MCV RBC AUTO: 93.6 FL (ref 81.4–97.8)
MICRO URNS: ABNORMAL
MONOCYTES # BLD AUTO: 0.68 K/UL (ref 0–0.85)
MONOCYTES NFR BLD AUTO: 12.4 % (ref 0–13.4)
NEUTROPHILS # BLD AUTO: 2.37 K/UL (ref 1.82–7.42)
NEUTROPHILS NFR BLD: 43.2 % (ref 44–72)
NITRITE UR QL STRIP.AUTO: NEGATIVE
NRBC # BLD AUTO: 0 K/UL
NRBC BLD-RTO: 0 /100 WBC (ref 0–0.2)
PH UR STRIP.AUTO: 5.5 [PH] (ref 5–8)
PLATELET # BLD AUTO: 184 K/UL (ref 164–446)
PMV BLD AUTO: 10.9 FL (ref 9–12.9)
POTASSIUM SERPL-SCNC: 4.3 MMOL/L (ref 3.6–5.5)
PROT SERPL-MCNC: 7.1 G/DL (ref 6–8.2)
PROT UR QL STRIP: NEGATIVE MG/DL
PSA SERPL-MCNC: 1.89 NG/ML (ref 0–4)
RBC # BLD AUTO: 4.68 M/UL (ref 4.7–6.1)
RBC UR QL AUTO: NEGATIVE
SODIUM SERPL-SCNC: 141 MMOL/L (ref 135–145)
SP GR UR STRIP.AUTO: 1.02
TRIGL SERPL-MCNC: 51 MG/DL (ref 0–149)
UROBILINOGEN UR STRIP.AUTO-MCNC: 0.2 MG/DL
WBC # BLD AUTO: 5.5 K/UL (ref 4.8–10.8)

## 2024-04-04 PROCEDURE — 84153 ASSAY OF PSA TOTAL: CPT

## 2024-04-04 PROCEDURE — 80053 COMPREHEN METABOLIC PANEL: CPT

## 2024-04-04 PROCEDURE — 81003 URINALYSIS AUTO W/O SCOPE: CPT

## 2024-04-04 PROCEDURE — 36415 COLL VENOUS BLD VENIPUNCTURE: CPT

## 2024-04-04 PROCEDURE — 80061 LIPID PANEL: CPT

## 2024-04-04 PROCEDURE — 85025 COMPLETE CBC W/AUTO DIFF WBC: CPT

## 2024-08-01 DIAGNOSIS — I10 ESSENTIAL HYPERTENSION: ICD-10-CM

## 2024-08-02 NOTE — TELEPHONE ENCOUNTER
Is the patient due for a refill? Yes    Was the patient seen the past year? No    Date of last office visit: 07/03/2024    Does the patient have an upcoming appointment?  No   If yes, When?     Provider to refill:VR    Does the patients insurance require a 100 day supply?  No

## 2024-08-05 RX ORDER — METOPROLOL TARTRATE 25 MG/1
TABLET, FILM COATED ORAL
Qty: 180 TABLET | Refills: 0 | Status: SHIPPED | OUTPATIENT
Start: 2024-08-05

## 2024-08-17 DIAGNOSIS — I10 ESSENTIAL HYPERTENSION: ICD-10-CM

## 2024-08-19 RX ORDER — LISINOPRIL 10 MG/1
TABLET ORAL
Qty: 90 TABLET | Refills: 0 | Status: SHIPPED | OUTPATIENT
Start: 2024-08-19

## 2024-10-23 DIAGNOSIS — I10 ESSENTIAL HYPERTENSION: ICD-10-CM

## 2024-10-25 RX ORDER — METOPROLOL TARTRATE 25 MG/1
25 TABLET, FILM COATED ORAL 2 TIMES DAILY
Qty: 180 TABLET | Refills: 0 | Status: SHIPPED | OUTPATIENT
Start: 2024-10-25

## 2024-11-14 DIAGNOSIS — I10 ESSENTIAL HYPERTENSION: ICD-10-CM

## 2024-11-14 RX ORDER — LISINOPRIL 10 MG/1
10 TABLET ORAL
Qty: 90 TABLET | Refills: 0 | Status: SHIPPED | OUTPATIENT
Start: 2024-11-14

## 2024-11-14 NOTE — TELEPHONE ENCOUNTER
Is the patient due for a refill? Yes    Was the patient seen the past year? No    Date of last office visit: 7/3/2023    Does the patient have an upcoming appointment?  Yes   If yes, When? 12/16/2024    Provider to refill:VR    Does the patient have [facility] and need 100-day supply? (This applies to ALL medications) Patient does not have SCP

## 2024-11-24 DIAGNOSIS — I25.10 CORONARY ARTERY DISEASE INVOLVING NATIVE CORONARY ARTERY OF NATIVE HEART WITHOUT ANGINA PECTORIS: ICD-10-CM

## 2024-11-24 DIAGNOSIS — E78.00 PURE HYPERCHOLESTEROLEMIA: ICD-10-CM

## 2024-11-25 RX ORDER — ATORVASTATIN CALCIUM 40 MG/1
40 TABLET, FILM COATED ORAL DAILY
Qty: 45 TABLET | Refills: 0 | Status: SHIPPED | OUTPATIENT
Start: 2024-11-25

## 2024-11-25 NOTE — TELEPHONE ENCOUNTER
Is the patient due for a refill? Yes    Was the patient seen the past year? No    Date of last office visit: 07.03.2023    Does the patient have an upcoming appointment?  Yes   If yes, When? 12.16.2024    Provider to refill:VR    Does the patient have nursing home Plus and need 100-day supply? (This applies to ALL medications) Patient does not have SCP

## 2024-12-04 ENCOUNTER — PATIENT MESSAGE (OUTPATIENT)
Dept: CARDIOLOGY | Facility: MEDICAL CENTER | Age: 74
End: 2024-12-04

## 2024-12-04 ENCOUNTER — OFFICE VISIT (OUTPATIENT)
Dept: DERMATOLOGY | Facility: IMAGING CENTER | Age: 74
End: 2024-12-04
Payer: COMMERCIAL

## 2024-12-04 DIAGNOSIS — Z12.83 SKIN CANCER SCREENING: ICD-10-CM

## 2024-12-04 DIAGNOSIS — E78.00 PURE HYPERCHOLESTEROLEMIA: ICD-10-CM

## 2024-12-04 DIAGNOSIS — L82.1 SK (SEBORRHEIC KERATOSIS): ICD-10-CM

## 2024-12-04 DIAGNOSIS — D22.9 MULTIPLE NEVI: ICD-10-CM

## 2024-12-04 DIAGNOSIS — L73.8 SEBACEOUS HYPERPLASIA: ICD-10-CM

## 2024-12-04 DIAGNOSIS — L81.4 LENTIGINES: ICD-10-CM

## 2024-12-04 DIAGNOSIS — D18.01 CHERRY ANGIOMA: ICD-10-CM

## 2024-12-04 DIAGNOSIS — I10 ESSENTIAL HYPERTENSION: ICD-10-CM

## 2024-12-04 PROCEDURE — 99212 OFFICE O/P EST SF 10 MIN: CPT | Performed by: NURSE PRACTITIONER

## 2024-12-04 NOTE — PROGRESS NOTES
DERMATOLOGY NOTE  FOLLOW UP VISIT       Chief complaint: Annual Exam (AGNES)     Denies new, growing, changing, itching or bleeding skin lesions today.      History of skin cancer: No  History of precancers/actinic keratoses: No  History of biopsies:No  History of blistering/severe sunburns:No  Family history of skin cancer:No  Family history of atypical moles:No      No Known Allergies     MEDICATIONS:  Medications relevant to specialty reviewed.     REVIEW OF SYSTEMS:   Positive for skin (see HPI)  Negative for fevers and chills       EXAM:  There were no vitals taken for this visit.  Constitutional: Well-developed, well-nourished, and in no distress.     A total body skin exam was performed excluding the genitals per patient preference and including the following areas: head (including face), neck, chest, abdomen, groin/buttocks, back, bilateral upper extremities, and bilateral lower extremities with the following pertinent findings listed below and/or in assessment/plan.     -sun exposed skin of trunk and b/l upper, lower extremities and face with scattered clinically benign light brown reticulated macules all of which were morphologically similar and none of which were suspicious for skin cancer today on exam    -Several scattered 1-3mm bright red macules and thin papules on the trunk and extremities    -Multiple tan medium brown skin-colored macules papules scattered over the trunk >> extremities--All with benign-appearing pigment network patterns on dermoscopy    -Several tan medium brown dark brown stuck-on waxy papules scattered on the trunk and extremities    -Few scattered sebaceous hyperplasia on face    IMPRESSION / PLAN:    1. Lentigines  - Benign-appearing nature of lesions discussed during exam.   - Advised to continue to monitor for any return to clinic for new or concerning changes.      2. Cherry angioma  - Benign-appearing nature of lesions discussed during exam.   - Advised to continue to monitor  for any return to clinic for new or concerning changes.      3. SK (seborrheic keratosis)  - Benign-appearing nature of lesions discussed during exam.   - Advised to continue to monitor for any return to clinic for new or concerning changes.      4. Multiple nevi  - Benign-appearing nature of lesions discussed during exam.   - Advised to continue to monitor for any return to clinic for new or concerning changes.  - ABCDE's of melanoma discussed      5. Sebaceous hyperplasia  - Benign-appearing nature of lesions discussed during exam.   - Advised to continue to monitor for any return to clinic for new or concerning changes.      6. Skin cancer screening  Skin cancer education  discussed importance of sun protective clothing, eyewear in addition to the use of broad spectrum sunscreen with SPF 30 or greater, as well as need for reapplication ~every 2 hours when exposed to UVR  discussed importance following up for any new or changing lesions as noted in handout given, but every 12 months exams in clinic in the setting of dermatologic history  ABCDE's of melanoma discussed/handout given        I have performed a physical exam and reviewed and updated ROS and Plan today (12/4/2024). In review of dermatology visit (11/30/2022), there are no changes except as documented above.         Please note that this dictation was created using voice recognition software. I have made every reasonable attempt to correct obvious errors, but I expect that there are errors of grammar and possibly content that I did not discover before finalizing the note.      Return to clinic in: Return in about 1 year (around 12/4/2025) for AGNES. and as needed for any new or changing skin lesions.

## 2024-12-10 ENCOUNTER — HOSPITAL ENCOUNTER (OUTPATIENT)
Dept: LAB | Facility: MEDICAL CENTER | Age: 74
End: 2024-12-10
Attending: INTERNAL MEDICINE
Payer: COMMERCIAL

## 2024-12-10 DIAGNOSIS — I10 ESSENTIAL HYPERTENSION: ICD-10-CM

## 2024-12-10 DIAGNOSIS — E78.00 PURE HYPERCHOLESTEROLEMIA: ICD-10-CM

## 2024-12-10 PROCEDURE — 80053 COMPREHEN METABOLIC PANEL: CPT

## 2024-12-10 PROCEDURE — 36415 COLL VENOUS BLD VENIPUNCTURE: CPT

## 2024-12-10 PROCEDURE — 80061 LIPID PANEL: CPT

## 2024-12-11 LAB
ALBUMIN SERPL BCP-MCNC: 4.3 G/DL (ref 3.2–4.9)
ALBUMIN/GLOB SERPL: 1.6 G/DL
ALP SERPL-CCNC: 67 U/L (ref 30–99)
ALT SERPL-CCNC: 22 U/L (ref 2–50)
ANION GAP SERPL CALC-SCNC: 10 MMOL/L (ref 7–16)
AST SERPL-CCNC: 31 U/L (ref 12–45)
BILIRUB SERPL-MCNC: 0.6 MG/DL (ref 0.1–1.5)
BUN SERPL-MCNC: 14 MG/DL (ref 8–22)
CALCIUM ALBUM COR SERPL-MCNC: 9.1 MG/DL (ref 8.5–10.5)
CALCIUM SERPL-MCNC: 9.3 MG/DL (ref 8.5–10.5)
CHLORIDE SERPL-SCNC: 105 MMOL/L (ref 96–112)
CHOLEST SERPL-MCNC: 149 MG/DL (ref 100–199)
CO2 SERPL-SCNC: 26 MMOL/L (ref 20–33)
CREAT SERPL-MCNC: 0.96 MG/DL (ref 0.5–1.4)
GFR SERPLBLD CREATININE-BSD FMLA CKD-EPI: 82 ML/MIN/1.73 M 2
GLOBULIN SER CALC-MCNC: 2.7 G/DL (ref 1.9–3.5)
GLUCOSE SERPL-MCNC: 116 MG/DL (ref 65–99)
HDLC SERPL-MCNC: 71 MG/DL
LDLC SERPL CALC-MCNC: 69 MG/DL
POTASSIUM SERPL-SCNC: 4.1 MMOL/L (ref 3.6–5.5)
PROT SERPL-MCNC: 7 G/DL (ref 6–8.2)
SODIUM SERPL-SCNC: 141 MMOL/L (ref 135–145)
TRIGL SERPL-MCNC: 46 MG/DL (ref 0–149)

## 2024-12-16 ENCOUNTER — OFFICE VISIT (OUTPATIENT)
Dept: CARDIOLOGY | Facility: MEDICAL CENTER | Age: 74
End: 2024-12-16
Attending: INTERNAL MEDICINE
Payer: COMMERCIAL

## 2024-12-16 VITALS
HEART RATE: 54 BPM | RESPIRATION RATE: 15 BRPM | WEIGHT: 188 LBS | HEIGHT: 74 IN | OXYGEN SATURATION: 97 % | BODY MASS INDEX: 24.13 KG/M2 | DIASTOLIC BLOOD PRESSURE: 68 MMHG | SYSTOLIC BLOOD PRESSURE: 126 MMHG

## 2024-12-16 DIAGNOSIS — E78.00 PURE HYPERCHOLESTEROLEMIA: ICD-10-CM

## 2024-12-16 DIAGNOSIS — I10 ESSENTIAL HYPERTENSION: ICD-10-CM

## 2024-12-16 DIAGNOSIS — I25.9 ISCHEMIC HEART DISEASE DUE TO CORONARY ARTERY OBSTRUCTION (HCC): ICD-10-CM

## 2024-12-16 DIAGNOSIS — R42 VERTIGO: ICD-10-CM

## 2024-12-16 DIAGNOSIS — I25.10 CORONARY ARTERY DISEASE INVOLVING NATIVE CORONARY ARTERY OF NATIVE HEART WITHOUT ANGINA PECTORIS: ICD-10-CM

## 2024-12-16 DIAGNOSIS — I24.0 ISCHEMIC HEART DISEASE DUE TO CORONARY ARTERY OBSTRUCTION (HCC): ICD-10-CM

## 2024-12-16 DIAGNOSIS — Z95.1 S/P CABG (CORONARY ARTERY BYPASS GRAFT): ICD-10-CM

## 2024-12-16 PROCEDURE — 3078F DIAST BP <80 MM HG: CPT | Performed by: INTERNAL MEDICINE

## 2024-12-16 PROCEDURE — 3074F SYST BP LT 130 MM HG: CPT | Performed by: INTERNAL MEDICINE

## 2024-12-16 PROCEDURE — 99214 OFFICE O/P EST MOD 30 MIN: CPT | Performed by: INTERNAL MEDICINE

## 2024-12-16 PROCEDURE — 99212 OFFICE O/P EST SF 10 MIN: CPT | Performed by: INTERNAL MEDICINE

## 2024-12-16 RX ORDER — ATORVASTATIN CALCIUM 40 MG/1
40 TABLET, FILM COATED ORAL DAILY
Qty: 90 TABLET | Refills: 3 | Status: SHIPPED | OUTPATIENT
Start: 2024-12-16

## 2024-12-16 ASSESSMENT — ENCOUNTER SYMPTOMS
DIZZINESS: 0
ORTHOPNEA: 0
PND: 0
DYSPNEA ON EXERTION: 0
NEAR-SYNCOPE: 0
FLANK PAIN: 0
BLURRED VISION: 0
HEARTBURN: 0
IRREGULAR HEARTBEAT: 0
SHORTNESS OF BREATH: 0
DECREASED APPETITE: 0
FEVER: 0
PALPITATIONS: 0
VOMITING: 0
WEIGHT LOSS: 0
DIARRHEA: 0
SYNCOPE: 0
CONSTIPATION: 0
WEIGHT GAIN: 0
ALTERED MENTAL STATUS: 0
BACK PAIN: 0
NAUSEA: 0
COUGH: 0
CLAUDICATION: 0
DEPRESSION: 0
ABDOMINAL PAIN: 0

## 2024-12-16 ASSESSMENT — FIBROSIS 4 INDEX: FIB4 SCORE: 2.73

## 2024-12-16 NOTE — PROGRESS NOTES
Cardiology Note    Chief Complaint   Patient presents with    Other     S/P CABG       History of Present Illness: José Arauz is a 74 y.o. male PMH CAD/CABG 2012, HLD, HTN who presents for follow up.    Continues to do well. No cardiac complaints. Taking half tab of atorvastatin as recalls some years past his LDL was too low. He will attempt whole tab again. Active without cardiovascular limitations.       Review of Systems   Constitutional: Negative for decreased appetite, fever, malaise/fatigue, weight gain and weight loss.   HENT:  Negative for congestion and nosebleeds.    Eyes:  Negative for blurred vision.   Cardiovascular:  Negative for chest pain, claudication, dyspnea on exertion, irregular heartbeat, leg swelling, near-syncope, orthopnea, palpitations, paroxysmal nocturnal dyspnea and syncope.   Respiratory:  Negative for cough and shortness of breath.    Endocrine: Negative for cold intolerance and heat intolerance.   Skin:  Negative for rash.   Musculoskeletal:  Negative for back pain.   Gastrointestinal:  Negative for abdominal pain, constipation, diarrhea, heartburn, melena, nausea and vomiting.   Genitourinary:  Negative for dysuria, flank pain and hematuria.   Neurological:  Negative for dizziness.   Psychiatric/Behavioral:  Negative for altered mental status and depression.          Past Medical History:   Diagnosis Date    ACS (acute coronary syndrome) (HCC) 10/24/2012    -10/2012 (Trop 0.3)     CAD (coronary artery disease) 01/04/2013    -s/p CABG 10/2012 (EF 60%); left internal mammary to left anterior descending, right saphenous vein graft to the first diagonal, saphenous vein graft to the most distal circumflex obtuse marginal branch, and saphenous vein graft to the right posterior descending  (small NSTEMI)      Heart attack (HCC)     Hypertension     Other and unspecified hyperlipidemia 01/04/2013         Past Surgical History:   Procedure Laterality Date    MULTIPLE CORONARY ARTERY  BYPASS ENDO VEIN HARVEST  10/25/2012    Performed by Abdi Worthington M.D. at SURGERY [facility] ORS    OTHER  01/01/1975    tonsillectomy    OTHER  33 yrs ago    vasectomy    VASECTOMY           Current Outpatient Medications   Medication Sig Dispense Refill    atorvastatin (LIPITOR) 40 MG Tab Take 1 Tablet by mouth every day. 90 Tablet 3    lisinopril (PRINIVIL) 10 MG Tab TAKE ONE TABLET BY MOUTH EVERY DAY 90 Tablet 0    metoprolol tartrate (LOPRESSOR) 25 MG Tab TAKE ONE TABLET BY MOUTH TWICE A  Tablet 0    tadalafil (CIALIS) 20 MG tablet Take 1 Tablet by mouth as needed for Erectile Dysfunction. No more than every other day 10 Tablet 3    Misc Natural Products (GLUCOSAMINE CHOND COMPLEX/MSM PO) Take  by mouth.      aspirin EC 81 MG EC tablet Take 1 Tablet by mouth every day. 30 Tab      No current facility-administered medications for this visit.         No Known Allergies      Family History   Problem Relation Age of Onset    Cancer Father         lung cancer    Heart Disease Neg Hx     Heart Failure Neg Hx          Social History     Socioeconomic History    Marital status:      Spouse name: Not on file    Number of children: Not on file    Years of education: Not on file    Highest education level: Associate degree: academic program   Occupational History    Not on file   Tobacco Use    Smoking status: Never    Smokeless tobacco: Never   Vaping Use    Vaping status: Never Used   Substance and Sexual Activity    Alcohol use: Yes     Comment: rare    Drug use: No    Sexual activity: Yes     Partners: Female     Birth control/protection: Male Sterilization   Other Topics Concern    Not on file   Social History Narrative    Not on file     Social Drivers of Health     Financial Resource Strain: Low Risk  (3/3/2024)    Overall Financial Resource Strain (CARDIA)     Difficulty of Paying Living Expenses: Not hard at all   Food Insecurity: Unknown (3/3/2024)    Hunger Vital Sign     Worried About  "Running Out of Food in the Last Year: Not on file     Ran Out of Food in the Last Year: Never true   Transportation Needs: No Transportation Needs (3/3/2024)    PRAPARE - Transportation     Lack of Transportation (Medical): No     Lack of Transportation (Non-Medical): No   Physical Activity: Sufficiently Active (3/3/2024)    Exercise Vital Sign     Days of Exercise per Week: 7 days     Minutes of Exercise per Session: 50 min   Stress: Stress Concern Present (3/3/2024)    [employer] [place] [employer] - Occupational Stress Questionnaire     Feeling of Stress : To some extent   Social Connections: Socially Integrated (3/3/2024)    Social Connection and Isolation Panel [NHANES]     Frequency of Communication with Friends and Family: Twice a week     Frequency of Social Gatherings with Friends and Family: Once a week     Attends [employer] Services: More than 4 times per year     Active Member of Clubs or Organizations: Yes     Attends Club or Organization Meetings: More than 4 times per year     Marital Status:    Intimate Partner Violence: Not on file   Housing Stability: Unknown (3/3/2024)    Housing Stability Vital Sign     Unable to Pay for Housing in the Last Year: No     Number of Places Lived in the Last Year: Not on file     Unstable Housing in the Last Year: No         Physical Exam:  Ambulatory Vitals  /68 (BP Location: Left arm, Patient Position: Sitting, BP Cuff Size: Adult)   Pulse (!) 54   Resp 15   Ht 1.88 m (6' 2\")   Wt 85.3 kg (188 lb)   SpO2 97%    BP Readings from Last 4 Encounters:   12/16/24 126/68   03/05/24 122/68   07/03/23 112/64   11/07/22 116/66     Weight/BMI:   Vitals:    12/16/24 1240   BP: 126/68   Weight: 85.3 kg (188 lb)   Height: 1.88 m (6' 2\")      Body mass index is 24.14 kg/m².  Wt Readings from Last 4 Encounters:   12/16/24 85.3 kg (188 lb)   03/05/24 85.7 kg (189 lb)   07/03/23 88 kg (194 lb)   11/07/22 87.1 kg (192 lb)       Physical " Exam  Constitutional:       General: He is not in acute distress.  HENT:      Head: Normocephalic and atraumatic.   Eyes:      Conjunctiva/sclera: Conjunctivae normal.      Pupils: Pupils are equal, round, and reactive to light.   Neck:      Vascular: No JVD.   Cardiovascular:      Rate and Rhythm: Normal rate and regular rhythm.      Heart sounds: Normal heart sounds. No murmur heard.     No friction rub. No gallop.   Pulmonary:      Effort: Pulmonary effort is normal. No respiratory distress.      Breath sounds: Normal breath sounds. No wheezing or rales.   Chest:      Chest wall: No tenderness.   Abdominal:      General: Bowel sounds are normal. There is no distension.      Palpations: Abdomen is soft.   Musculoskeletal:      Cervical back: Normal range of motion and neck supple.   Skin:     General: Skin is warm and dry.   Neurological:      Mental Status: He is alert and oriented to person, place, and time.   Psychiatric:         Mood and Affect: Affect normal.         Judgment: Judgment normal.         Lab Data Review:  Lab Results   Component Value Date/Time    CHOLSTRLTOT 149 12/10/2024 07:32 AM    LDL 69 12/10/2024 07:32 AM    HDL 71 12/10/2024 07:32 AM    TRIGLYCERIDE 46 12/10/2024 07:32 AM       Lab Results   Component Value Date/Time    SODIUM 141 12/10/2024 07:32 AM    POTASSIUM 4.1 12/10/2024 07:32 AM    CHLORIDE 105 12/10/2024 07:32 AM    CO2 26 12/10/2024 07:32 AM    GLUCOSE 116 (H) 12/10/2024 07:32 AM    BUN 14 12/10/2024 07:32 AM    CREATININE 0.96 12/10/2024 07:32 AM     Estimated Creatinine Clearance: 76.1 mL/min (by C-G formula based on SCr of 0.96 mg/dL).  Lab Results   Component Value Date/Time    ALKPHOSPHAT 67 12/10/2024 07:32 AM    ASTSGOT 31 12/10/2024 07:32 AM    ALTSGPT 22 12/10/2024 07:32 AM    TBILIRUBIN 0.6 12/10/2024 07:32 AM      Lab Results   Component Value Date/Time    WBC 5.5 04/04/2024 08:38 AM     Lab Results   Component Value Date/Time    HBA1C 5.5 04/22/2022 09:15 AM     No  "components found for: \"TROP\"      Cardiac Imaging and Procedures Review:      TTE 3/2017  CONCLUSIONS  Compared to the images of the study done 3/14/14; compared to the   report of the study done - there has been no significant change.   Normal left ventricular systolic function.  Left ventricular ejection fraction is visually estimated to be 60%.  Grade II diastolic dysfunction.  No significant valve abnormalities.   Estimated right ventricular systolic pressure is 20 mmHg.    Medical Decision Making:  Problem List Items Addressed This Visit       Ischemic heart disease due to coronary artery obstruction (HCC)    Relevant Medications    atorvastatin (LIPITOR) 40 MG Tab    Hyperlipidemia    Relevant Medications    atorvastatin (LIPITOR) 40 MG Tab    S/P CABG (coronary artery bypass graft)    Essential hypertension    Relevant Medications    atorvastatin (LIPITOR) 40 MG Tab    Vertigo    Coronary artery disease involving native coronary artery of native heart without angina pectoris    Relevant Medications    atorvastatin (LIPITOR) 40 MG Tab       CAD / CABG - stable, asymptomatic. Continue aspirin and statin. Continue metoprolol.    HLD - controlled. Continue high intensity statin. Goal LDL <70 and trig <150. Annual lipids with PCP.     HTN - controlled. Goal <130/80. Continue regimen.     It was my pleasure to meet with Mr. Arauz.    Today's visit is associated with medical care services that serve as the continuing focal point for all necessary health care services related to the patient's single, serious condition or multiple chronic complex conditions.  This includes providing services to the patient on an ongoing basis that results in care that is collaborative and personalized to the patient.  The services result in a comprehensive, longitudinal, and continuous relationship with the patient and involve delivery of team-based care that is accessible, coordinated with other practitioners and providers, and " integrated with the broader health care landscape.

## 2025-01-18 DIAGNOSIS — I10 ESSENTIAL HYPERTENSION: ICD-10-CM

## 2025-01-20 RX ORDER — METOPROLOL TARTRATE 25 MG/1
25 TABLET, FILM COATED ORAL 2 TIMES DAILY
Qty: 180 TABLET | Refills: 3 | Status: SHIPPED | OUTPATIENT
Start: 2025-01-20

## 2025-01-20 NOTE — TELEPHONE ENCOUNTER
Is the patient due for a refill? Yes    Was the patient seen the last 15 months? Yes    Date of last office visit: 12/16/24    Does the patient have an upcoming appointment?  No   If yes, When?     Provider to refill:VR    Does the patient have assisted Plus and need 100-day supply? (This applies to ALL medications) Patient does not have SCP

## 2025-02-02 DIAGNOSIS — I10 ESSENTIAL HYPERTENSION: ICD-10-CM

## 2025-02-03 RX ORDER — LISINOPRIL 10 MG/1
10 TABLET ORAL DAILY
Qty: 90 TABLET | Refills: 3 | Status: SHIPPED | OUTPATIENT
Start: 2025-02-03

## 2025-02-03 NOTE — TELEPHONE ENCOUNTER
Is the patient due for a refill? Yes    Was the patient seen the last 15 months? Yes    Date of last office visit: 12/16/2024    Does the patient have an upcoming appointment?  No       Provider to refill:VR    Does the patient have [facility] Plus and need 100-day supply? (This applies to ALL medications) Patient does not have SCP

## 2025-03-11 ENCOUNTER — OFFICE VISIT (OUTPATIENT)
Dept: MEDICAL GROUP | Facility: PHYSICIAN GROUP | Age: 75
End: 2025-03-11
Payer: COMMERCIAL

## 2025-03-11 VITALS
RESPIRATION RATE: 16 BRPM | DIASTOLIC BLOOD PRESSURE: 66 MMHG | HEIGHT: 74 IN | OXYGEN SATURATION: 97 % | SYSTOLIC BLOOD PRESSURE: 122 MMHG | BODY MASS INDEX: 25.28 KG/M2 | TEMPERATURE: 98.7 F | HEART RATE: 54 BPM | WEIGHT: 197 LBS

## 2025-03-11 DIAGNOSIS — Z12.11 COLON CANCER SCREENING: ICD-10-CM

## 2025-03-11 DIAGNOSIS — Z00.00 WELLNESS EXAMINATION: ICD-10-CM

## 2025-03-11 DIAGNOSIS — I10 ESSENTIAL HYPERTENSION: ICD-10-CM

## 2025-03-11 DIAGNOSIS — M54.40 ACUTE RIGHT-SIDED LOW BACK PAIN WITH SCIATICA, SCIATICA LATERALITY UNSPECIFIED: ICD-10-CM

## 2025-03-11 DIAGNOSIS — E78.00 PURE HYPERCHOLESTEROLEMIA: ICD-10-CM

## 2025-03-11 PROCEDURE — 3074F SYST BP LT 130 MM HG: CPT | Performed by: FAMILY MEDICINE

## 2025-03-11 PROCEDURE — 3078F DIAST BP <80 MM HG: CPT | Performed by: FAMILY MEDICINE

## 2025-03-11 PROCEDURE — 99397 PER PM REEVAL EST PAT 65+ YR: CPT | Performed by: FAMILY MEDICINE

## 2025-03-11 RX ORDER — METHYLPREDNISOLONE 4 MG/1
TABLET ORAL
Qty: 21 TABLET | Refills: 0 | Status: SHIPPED | OUTPATIENT
Start: 2025-03-11

## 2025-03-11 RX ORDER — SILDENAFIL 100 MG/1
100 TABLET, FILM COATED ORAL
Qty: 10 TABLET | Refills: 3 | Status: SHIPPED | OUTPATIENT
Start: 2025-03-11

## 2025-03-11 ASSESSMENT — PATIENT HEALTH QUESTIONNAIRE - PHQ9: CLINICAL INTERPRETATION OF PHQ2 SCORE: 0

## 2025-03-11 ASSESSMENT — FIBROSIS 4 INDEX: FIB4 SCORE: 2.73

## 2025-03-11 NOTE — ASSESSMENT & PLAN NOTE
This is a chronic problem.  He is on a statin and tolerating it well.  Cholesterol is under good control.

## 2025-03-11 NOTE — ASSESSMENT & PLAN NOTE
This is a new problem.  Patient states he was in [place] a week ago when he started developing right lower back pain that radiate down his right leg.  Has never had this before.  He was sleeping on a much softer mattress than usual.  He states the pain does come and go and can be worse when standing.

## 2025-03-11 NOTE — PROGRESS NOTES
Subjective:     CC: Here for his exam and also some low back pain.    HPI:   José presents today with the following medical concern:    Wellness examination  Patient is here for his exam he is also due for some labs that will be due next month.  His cardiologist is done some other ones.  He is also having some low back pain refer to other note for that.  He does have some ED issues.  He states that tadalafil is working very well and he like to go back to Viagra.    Acute right-sided low back pain with sciatica  This is a new problem.  Patient states he was in [place] a week ago when he started developing right lower back pain that radiate down his right leg.  Has never had this before.  He was sleeping on a much softer mattress than usual.  He states the pain does come and go and can be worse when standing.    Essential hypertension  This is a chronic stable condition.  Blood pressure well-controlled on current medications.    Hyperlipidemia  This is a chronic problem.  He is on a statin and tolerating it well.  Cholesterol is under good control.    Colon cancer screening  This is a screening issue.  He is due for colon cancer screening and that will be ordered today.    Past Medical History:   Diagnosis Date    ACS (acute coronary syndrome) (HCC) 10/24/2012    -10/2012 (Trop 0.3)     CAD (coronary artery disease) 01/04/2013    -s/p CABG 10/2012 (EF 60%); left internal mammary to left anterior descending, right saphenous vein graft to the first diagonal, saphenous vein graft to the most distal circumflex obtuse marginal branch, and saphenous vein graft to the right posterior descending  (small NSTEMI)      Heart attack (HCC)     Hypertension     Other and unspecified hyperlipidemia 01/04/2013       Social History     Tobacco Use    Smoking status: Never    Smokeless tobacco: Never   Vaping Use    Vaping status: Never Used   Substance Use Topics    Alcohol use: Yes     Comment: rare    Drug use: No       Current  "Outpatient Medications Ordered in Epic   Medication Sig Dispense Refill    sildenafil citrate (VIAGRA) 100 MG tablet Take 1 Tablet by mouth 1 time a day as needed for Erectile Dysfunction. 10 Tablet 3    methylPREDNISolone (MEDROL DOSEPAK) 4 MG Tablet Therapy Pack As directed on the packaging label. 21 Tablet 0    lisinopril (PRINIVIL) 10 MG Tab Take 1 Tablet by mouth every day. 90 Tablet 3    metoprolol tartrate (LOPRESSOR) 25 MG Tab TAKE ONE TABLET BY MOUTH TWICE A  Tablet 3    atorvastatin (LIPITOR) 40 MG Tab Take 1 Tablet by mouth every day. 90 Tablet 3    aspirin EC 81 MG EC tablet Take 1 Tablet by mouth every day. 30 Tab     Misc Natural Products (GLUCOSAMINE CHOND COMPLEX/MSM PO) Take  by mouth.       No current Epic-ordered facility-administered medications on file.       Allergies:  Patient has no known allergies.    Health Maintenance: Completed    ROS:  Gen: no fevers/chills, no changes in weight  Eyes: no changes in vision  ENT: no sore throat, no hearing loss, no bloody nose  Pulm: no sob, no cough  CV: no chest pain, no palpitations  GI: no nausea/vomiting, no diarrhea  : no dysuria  MSk: no myalgias  Skin: no rash  Neuro: no headaches, no numbness/tingling  Heme/Lymph: no easy bruising      Objective:       Exam:  /66 (BP Location: Right arm, Patient Position: Sitting, BP Cuff Size: Adult)   Pulse (!) 54   Temp 37.1 °C (98.7 °F) (Temporal)   Resp 16   Ht 1.88 m (6' 2\")   Wt 89.4 kg (197 lb)   SpO2 97%   BMI 25.29 kg/m²  Body mass index is 25.29 kg/m².    Gen: Alert and oriented, No apparent distress.  Eyes:   Extraocular motions intact.  No scleral icterus seen.  Ears:    Ear canals and TMs are clear.  Neck: Neck is supple without lymphadenopathy.  Thyroid exam is normal.  Lungs: Normal effort, CTA bilaterally, no wheezes, rhonchi, or rales  CV: Regular rate and rhythm. No murmurs, rubs, or gallops.  No carotid bruits heard.  Abdomen: Soft, nontender, and organomegaly or " masses.  Ext: No clubbing, cyanosis, edema.  Neuro: Cranial nerves II through VIII are gross intact.  Gait is normal.  Straight leg raise is positive at 75 to 80 degrees on the right with worsening on plantarflexion of the ankle.    Labs: Reviewed and ordered    Assessment & Plan:     74 y.o. male with the following -     1. Acute right-sided low back pain with sciatica, sciatica laterality unspecified  This is an acute problem.  I told patient most likely has some nerve impingement.  Will go ahead and start him on a Medrol Dosepak.  He has already tried Advil and Motrin.  He does have an appointment for his physical therapy on Thursday so we sent an order in for that as well.  He is told if he does not get better with this we will need to get an x-ray next.  - Referral to Physical Therapy    2. Colon cancer screening  This is screening issue.  Cologuard ordered.  - Cologuard® colon cancer screening    3. Essential hypertension  This is a chronic stable condition.  Continue current medications.    4. Pure hypercholesterolemia  This is a chronic problem.  Continue current medications.    5. Wellness examination  Patient's exam was performed.  General health issues addressed.  Other baseline labs that his cardiologist did not order work was placed.  - PROSTATE SPECIFIC AG SCREENING; Future  - URINALYSIS,CULTURE IF INDICATED; Future  - CBC WITH DIFFERENTIAL; Future    Anticipatory guidance: Patient talked to about baseline labs and colon cancer screening issues.  Return in about 1 year (around 3/11/2026) for annual exam.    Please note that this dictation was created using voice recognition software. I have made every reasonable attempt to correct obvious errors, but I expect that there are errors of grammar and possibly content that I did not discover before finalizing the note.

## 2025-03-11 NOTE — ASSESSMENT & PLAN NOTE
Patient is here for his exam he is also due for some labs that will be due next month.  His cardiologist is done some other ones.  He is also having some low back pain refer to other note for that.  He does have some ED issues.  He states that tadalafil is working very well and he like to go back to Viagra.

## 2025-03-18 NOTE — Clinical Note
REFERRAL APPROVAL NOTICE         Sent on March 18, 2025                   José Arauz  [address]  [facility] [address]                   Dear Mr. Arauz,    After a careful review of the medical information and benefit coverage, [facility] has processed your referral. See below for additional details.    If applicable, you must be actively enrolled with your insurance for coverage of the authorized service. If you have any questions regarding your coverage, please contact your insurance directly.    REFERRAL INFORMATION   Referral #:  [id number]  Referred-To Provider    Referred-By Provider:  Physical Therapy    Keo Cruz III, M.D.   [facility] PHYSICAL THERAPY      [address]  [facility] [address]  [phone] [address]  [place] [place] [address]  [phone]    Referral Start Date:  03/11/2025  Referral End Date:   03/11/2026             SCHEDULING  If you do not already have an appointment, please call [phone] to make an appointment.     MORE INFORMATION  If you do not already have a [facility] account, sign up at: [email or web address]  You can access your medical information, make appointments, see lab results, billing information, and more.  If you have questions regarding this referral, please contact  the [facility] Referrals department at:             [phone]. Monday - Friday 8:00AM - 5:00PM.     Sincerely,    [facility]

## 2025-03-29 LAB — NONINV COLON CA DNA+OCC BLD SCRN STL QL: NEGATIVE

## 2025-03-30 ENCOUNTER — RESULTS FOLLOW-UP (OUTPATIENT)
Dept: MEDICAL GROUP | Facility: PHYSICIAN GROUP | Age: 75
End: 2025-03-30

## 2025-08-29 ENCOUNTER — OFFICE VISIT (OUTPATIENT)
Dept: MEDICAL GROUP | Facility: PHYSICIAN GROUP | Age: 75
End: 2025-08-29
Payer: COMMERCIAL

## 2025-08-29 ENCOUNTER — HOSPITAL ENCOUNTER (OUTPATIENT)
Dept: LAB | Facility: MEDICAL CENTER | Age: 75
End: 2025-08-29
Attending: FAMILY MEDICINE
Payer: COMMERCIAL

## 2025-08-29 VITALS
DIASTOLIC BLOOD PRESSURE: 70 MMHG | TEMPERATURE: 98.3 F | OXYGEN SATURATION: 96 % | RESPIRATION RATE: 16 BRPM | WEIGHT: 196 LBS | SYSTOLIC BLOOD PRESSURE: 118 MMHG | BODY MASS INDEX: 25.15 KG/M2 | HEIGHT: 74 IN | HEART RATE: 60 BPM

## 2025-08-29 DIAGNOSIS — Z00.00 WELLNESS EXAMINATION: ICD-10-CM

## 2025-08-29 DIAGNOSIS — H92.03 EAR DISCOMFORT, BILATERAL: Primary | ICD-10-CM

## 2025-08-29 LAB — PSA SERPL DL<=0.01 NG/ML-MCNC: 1.99 NG/ML (ref 0–4)

## 2025-08-29 PROCEDURE — 36415 COLL VENOUS BLD VENIPUNCTURE: CPT

## 2025-08-29 PROCEDURE — 84153 ASSAY OF PSA TOTAL: CPT

## 2025-08-29 ASSESSMENT — FIBROSIS 4 INDEX: FIB4 SCORE: 2.73
